# Patient Record
Sex: FEMALE | Race: WHITE | NOT HISPANIC OR LATINO | Employment: UNEMPLOYED | URBAN - METROPOLITAN AREA
[De-identification: names, ages, dates, MRNs, and addresses within clinical notes are randomized per-mention and may not be internally consistent; named-entity substitution may affect disease eponyms.]

---

## 2021-09-06 ENCOUNTER — HOSPITAL ENCOUNTER (EMERGENCY)
Facility: HOSPITAL | Age: 21
Discharge: LEFT BEFORE DISCHARGE | End: 2021-09-06
Admitting: EMERGENCY MEDICINE

## 2021-09-06 ENCOUNTER — APPOINTMENT (OUTPATIENT)
Dept: CT IMAGING | Facility: HOSPITAL | Age: 21
End: 2021-09-06

## 2021-09-06 VITALS
BODY MASS INDEX: 16.63 KG/M2 | HEART RATE: 97 BPM | SYSTOLIC BLOOD PRESSURE: 103 MMHG | DIASTOLIC BLOOD PRESSURE: 68 MMHG | HEIGHT: 62 IN | RESPIRATION RATE: 20 BRPM | TEMPERATURE: 97.7 F | WEIGHT: 90.39 LBS | OXYGEN SATURATION: 98 %
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
## 2021-09-06 DIAGNOSIS — T07.XXXA ABRASIONS OF MULTIPLE SITES: ICD-10-CM

## 2021-09-06 DIAGNOSIS — S22.31XA CLOSED FRACTURE OF ONE RIB OF RIGHT SIDE, INITIAL ENCOUNTER: ICD-10-CM

## 2021-09-06 DIAGNOSIS — S27.321A CONTUSION OF RIGHT LUNG, INITIAL ENCOUNTER: ICD-10-CM

## 2021-09-06 DIAGNOSIS — S42.001A CLOSED NONDISPLACED FRACTURE OF RIGHT CLAVICLE, UNSPECIFIED PART OF CLAVICLE, INITIAL ENCOUNTER: ICD-10-CM

## 2021-09-06 LAB
ALBUMIN SERPL-MCNC: 3.8 G/DL (ref 3.5–5.2)
ALBUMIN/GLOB SERPL: 1.6 G/DL
ALP SERPL-CCNC: 75 U/L (ref 39–117)
ALT SERPL W P-5'-P-CCNC: 19 U/L (ref 1–33)
AMPHET+METHAMPHET UR QL: POSITIVE
ANION GAP SERPL CALCULATED.3IONS-SCNC: 8 MMOL/L (ref 5–15)
AST SERPL-CCNC: 26 U/L (ref 1–32)
B-HCG UR QL: NEGATIVE
BACTERIA UR QL AUTO: ABNORMAL /HPF
BARBITURATES UR QL SCN: NEGATIVE
BASOPHILS # BLD AUTO: 0 10*3/MM3 (ref 0–0.2)
BASOPHILS NFR BLD AUTO: 0.4 % (ref 0–1.5)
BENZODIAZ UR QL SCN: NEGATIVE
BILIRUB SERPL-MCNC: 0.4 MG/DL (ref 0–1.2)
BILIRUB UR QL STRIP: NEGATIVE
BUN SERPL-MCNC: 10 MG/DL (ref 6–20)
BUN/CREAT SERPL: 13 (ref 7–25)
CALCIUM SPEC-SCNC: 8.1 MG/DL (ref 8.6–10.5)
CANNABINOIDS SERPL QL: NEGATIVE
CHLORIDE SERPL-SCNC: 103 MMOL/L (ref 98–107)
CLARITY UR: ABNORMAL
CO2 SERPL-SCNC: 27 MMOL/L (ref 22–29)
COCAINE UR QL: NEGATIVE
COLOR UR: ABNORMAL
CREAT SERPL-MCNC: 0.77 MG/DL (ref 0.57–1)
DEPRECATED RDW RBC AUTO: 39.4 FL (ref 37–54)
EOSINOPHIL # BLD AUTO: 0.1 10*3/MM3 (ref 0–0.4)
EOSINOPHIL NFR BLD AUTO: 0.5 % (ref 0.3–6.2)
ERYTHROCYTE [DISTWIDTH] IN BLOOD BY AUTOMATED COUNT: 13 % (ref 12.3–15.4)
GFR SERPL CREATININE-BSD FRML MDRD: 96 ML/MIN/1.73
GLOBULIN UR ELPH-MCNC: 2.4 GM/DL
GLUCOSE SERPL-MCNC: 89 MG/DL (ref 65–99)
GLUCOSE UR STRIP-MCNC: NEGATIVE MG/DL
GRAN CASTS URNS QL MICRO: ABNORMAL /LPF
HCT VFR BLD AUTO: 33.1 % (ref 34–46.6)
HGB BLD-MCNC: 11.1 G/DL (ref 12–15.9)
HGB UR QL STRIP.AUTO: ABNORMAL
HYALINE CASTS UR QL AUTO: ABNORMAL /LPF
KETONES UR QL STRIP: NEGATIVE
LEUKOCYTE ESTERASE UR QL STRIP.AUTO: ABNORMAL
LIPASE SERPL-CCNC: 11 U/L (ref 13–60)
LYMPHOCYTES # BLD AUTO: 1.6 10*3/MM3 (ref 0.7–3.1)
LYMPHOCYTES NFR BLD AUTO: 15.7 % (ref 19.6–45.3)
MCH RBC QN AUTO: 29.2 PG (ref 26.6–33)
MCHC RBC AUTO-ENTMCNC: 33.6 G/DL (ref 31.5–35.7)
MCV RBC AUTO: 87.1 FL (ref 79–97)
METHADONE UR QL SCN: NEGATIVE
MONOCYTES # BLD AUTO: 1.2 10*3/MM3 (ref 0.1–0.9)
MONOCYTES NFR BLD AUTO: 11.4 % (ref 5–12)
NEUTROPHILS NFR BLD AUTO: 7.4 10*3/MM3 (ref 1.7–7)
NEUTROPHILS NFR BLD AUTO: 72 % (ref 42.7–76)
NITRITE UR QL STRIP: POSITIVE
NRBC BLD AUTO-RTO: 0.1 /100 WBC (ref 0–0.2)
OPIATES UR QL: POSITIVE
OXYCODONE UR QL SCN: NEGATIVE
PH UR STRIP.AUTO: 7 [PH] (ref 5–8)
PLATELET # BLD AUTO: 307 10*3/MM3 (ref 140–450)
PMV BLD AUTO: 7.3 FL (ref 6–12)
POTASSIUM SERPL-SCNC: 3.8 MMOL/L (ref 3.5–5.2)
PROT SERPL-MCNC: 6.2 G/DL (ref 6–8.5)
PROT UR QL STRIP: ABNORMAL
RBC # BLD AUTO: 3.81 10*6/MM3 (ref 3.77–5.28)
RBC # UR: ABNORMAL /HPF
REF LAB TEST METHOD: ABNORMAL
SODIUM SERPL-SCNC: 138 MMOL/L (ref 136–145)
SP GR UR STRIP: 1.02 (ref 1–1.03)
SQUAMOUS #/AREA URNS HPF: ABNORMAL /HPF
UROBILINOGEN UR QL STRIP: ABNORMAL
WBC # BLD AUTO: 10.3 10*3/MM3 (ref 3.4–10.8)
WBC CLUMPS # UR AUTO: ABNORMAL /HPF
WBC UR QL AUTO: ABNORMAL /HPF

## 2021-09-06 PROCEDURE — 71260 CT THORAX DX C+: CPT

## 2021-09-06 PROCEDURE — 85025 COMPLETE CBC W/AUTO DIFF WBC: CPT | Performed by: NURSE PRACTITIONER

## 2021-09-06 PROCEDURE — 81025 URINE PREGNANCY TEST: CPT | Performed by: NURSE PRACTITIONER

## 2021-09-06 PROCEDURE — 0 IOPAMIDOL PER 1 ML: Performed by: NURSE PRACTITIONER

## 2021-09-06 PROCEDURE — 70450 CT HEAD/BRAIN W/O DYE: CPT

## 2021-09-06 PROCEDURE — 96375 TX/PRO/DX INJ NEW DRUG ADDON: CPT

## 2021-09-06 PROCEDURE — 80053 COMPREHEN METABOLIC PANEL: CPT | Performed by: NURSE PRACTITIONER

## 2021-09-06 PROCEDURE — 25010000002 CEFTRIAXONE PER 250 MG: Performed by: NURSE PRACTITIONER

## 2021-09-06 PROCEDURE — 80307 DRUG TEST PRSMV CHEM ANLYZR: CPT | Performed by: NURSE PRACTITIONER

## 2021-09-06 PROCEDURE — 83690 ASSAY OF LIPASE: CPT | Performed by: NURSE PRACTITIONER

## 2021-09-06 PROCEDURE — 25010000002 MORPHINE PER 10 MG: Performed by: NURSE PRACTITIONER

## 2021-09-06 PROCEDURE — 99283 EMERGENCY DEPT VISIT LOW MDM: CPT

## 2021-09-06 PROCEDURE — 74177 CT ABD & PELVIS W/CONTRAST: CPT

## 2021-09-06 PROCEDURE — 96374 THER/PROPH/DIAG INJ IV PUSH: CPT

## 2021-09-06 PROCEDURE — 25010000002 ONDANSETRON PER 1 MG: Performed by: NURSE PRACTITIONER

## 2021-09-06 PROCEDURE — 87186 SC STD MICRODIL/AGAR DIL: CPT | Performed by: NURSE PRACTITIONER

## 2021-09-06 PROCEDURE — 81001 URINALYSIS AUTO W/SCOPE: CPT | Performed by: NURSE PRACTITIONER

## 2021-09-06 PROCEDURE — 87086 URINE CULTURE/COLONY COUNT: CPT | Performed by: NURSE PRACTITIONER

## 2021-09-06 PROCEDURE — 87077 CULTURE AEROBIC IDENTIFY: CPT | Performed by: NURSE PRACTITIONER

## 2021-09-06 RX ORDER — ONDANSETRON 2 MG/ML
4 INJECTION INTRAMUSCULAR; INTRAVENOUS ONCE
Status: COMPLETED | OUTPATIENT
Start: 2021-09-06 | End: 2021-09-06

## 2021-09-06 RX ORDER — MORPHINE SULFATE 4 MG/ML
4 INJECTION, SOLUTION INTRAMUSCULAR; INTRAVENOUS ONCE
Status: COMPLETED | OUTPATIENT
Start: 2021-09-06 | End: 2021-09-06

## 2021-09-06 RX ORDER — SODIUM CHLORIDE 0.9 % (FLUSH) 0.9 %
10 SYRINGE (ML) INJECTION AS NEEDED
Status: DISCONTINUED | OUTPATIENT
Start: 2021-09-06 | End: 2021-09-06 | Stop reason: HOSPADM

## 2021-09-06 RX ADMIN — ONDANSETRON 4 MG: 2 INJECTION INTRAMUSCULAR; INTRAVENOUS at 15:31

## 2021-09-06 RX ADMIN — CEFTRIAXONE 1 G: 10 INJECTION, POWDER, FOR SOLUTION INTRAVENOUS at 16:47

## 2021-09-06 RX ADMIN — SODIUM CHLORIDE 1000 ML: 0.9 INJECTION, SOLUTION INTRAVENOUS at 15:30

## 2021-09-06 RX ADMIN — IOPAMIDOL 100 ML: 755 INJECTION, SOLUTION INTRAVENOUS at 16:36

## 2021-09-06 RX ADMIN — MORPHINE SULFATE 4 MG: 4 INJECTION INTRAVENOUS at 15:31
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
## 2021-09-06 NOTE — ED NOTES
Pt was given iv rocephin states her pain in controlled but her whole body is sore with any kind of movement, vitals are stable awaiting radiology to be read      Ludy Wells RN  09/06/21 9085
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
## 2021-09-08 LAB — BACTERIA SPEC AEROBE CULT: ABNORMAL

## 2021-09-26 ENCOUNTER — APPOINTMENT (OUTPATIENT)
Dept: GENERAL RADIOLOGY | Facility: HOSPITAL | Age: 21
End: 2021-09-26

## 2021-09-26 ENCOUNTER — HOSPITAL ENCOUNTER (EMERGENCY)
Facility: HOSPITAL | Age: 21
Discharge: HOME | End: 2021-09-26
Attending: EMERGENCY MEDICINE | Admitting: EMERGENCY MEDICINE

## 2021-09-26 VITALS
RESPIRATION RATE: 16 BRPM | SYSTOLIC BLOOD PRESSURE: 154 MMHG | HEART RATE: 125 BPM | HEIGHT: 62 IN | DIASTOLIC BLOOD PRESSURE: 82 MMHG | BODY MASS INDEX: 17.3 KG/M2 | TEMPERATURE: 98.9 F | WEIGHT: 94 LBS | OXYGEN SATURATION: 100 %

## 2021-09-26 DIAGNOSIS — S42.021A CLOSED DISPLACED FRACTURE OF SHAFT OF RIGHT CLAVICLE, INITIAL ENCOUNTER: Primary | ICD-10-CM

## 2021-09-26 PROCEDURE — 73000 X-RAY EXAM OF COLLAR BONE: CPT

## 2021-09-26 PROCEDURE — 99282 EMERGENCY DEPT VISIT SF MDM: CPT

## 2021-09-26 RX ORDER — HYDROCODONE BITARTRATE AND ACETAMINOPHEN 10; 325 MG/1; MG/1
1 TABLET ORAL ONCE AS NEEDED
Status: DISCONTINUED | OUTPATIENT
Start: 2021-09-26 | End: 2021-09-26 | Stop reason: HOSPADM

## 2021-09-26 RX ORDER — HYDROCODONE BITARTRATE AND ACETAMINOPHEN 10; 325 MG/1; MG/1
1 TABLET ORAL EVERY 6 HOURS PRN
Qty: 12 TABLET | Refills: 0 | Status: SHIPPED | OUTPATIENT
Start: 2021-09-26 | End: 2021-10-08

## 2021-09-27 NOTE — DISCHARGE INSTRUCTIONS
Call the doctor listed above tomorrow morning for a follow-up appointment.  Wear the sling that you have and apply an ice pack to the swelling tonight.  Hydrocodone for pain.
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
## 2021-10-08 ENCOUNTER — TRANSCRIBE ORDERS (OUTPATIENT)
Dept: ADMINISTRATIVE | Facility: HOSPITAL | Age: 21
End: 2021-10-08

## 2021-10-08 ENCOUNTER — HOSPITAL ENCOUNTER (OUTPATIENT)
Facility: HOSPITAL | Age: 21
Setting detail: HOSPITAL OUTPATIENT SURGERY
End: 2021-10-08
Attending: ORTHOPAEDIC SURGERY | Admitting: ORTHOPAEDIC SURGERY

## 2021-10-08 ENCOUNTER — LAB (OUTPATIENT)
Dept: LAB | Facility: HOSPITAL | Age: 21
End: 2021-10-08

## 2021-10-08 DIAGNOSIS — Z11.52 ENCOUNTER FOR SCREENING FOR SEVERE ACUTE RESPIRATORY SYNDROME CORONAVIRUS 2 (SARS-COV-2) INFECTION: ICD-10-CM

## 2021-10-08 DIAGNOSIS — Z11.52 ENCOUNTER FOR SCREENING FOR SEVERE ACUTE RESPIRATORY SYNDROME CORONAVIRUS 2 (SARS-COV-2) INFECTION: Primary | ICD-10-CM

## 2021-10-08 LAB — SARS-COV-2 RNA PNL SPEC NAA+PROBE: DETECTED

## 2021-10-08 PROCEDURE — U0003 INFECTIOUS AGENT DETECTION BY NUCLEIC ACID (DNA OR RNA); SEVERE ACUTE RESPIRATORY SYNDROME CORONAVIRUS 2 (SARS-COV-2) (CORONAVIRUS DISEASE [COVID-19]), AMPLIFIED PROBE TECHNIQUE, MAKING USE OF HIGH THROUGHPUT TECHNOLOGIES AS DESCRIBED BY CMS-2020-01-R: HCPCS

## 2021-10-08 PROCEDURE — C9803 HOPD COVID-19 SPEC COLLECT: HCPCS

## 2021-10-08 NOTE — PAT
Called pt several times to go over history for scheduled surgery on 10/9/2021.  Pt never called back.

## 2021-10-20 ENCOUNTER — LAB (OUTPATIENT)
Dept: LAB | Facility: HOSPITAL | Age: 21
End: 2021-10-20

## 2021-10-20 LAB
DEPRECATED RDW RBC AUTO: 39.4 FL (ref 37–54)
ERYTHROCYTE [DISTWIDTH] IN BLOOD BY AUTOMATED COUNT: 12.7 % (ref 12.3–15.4)
HCT VFR BLD AUTO: 35.2 % (ref 34–46.6)
HGB BLD-MCNC: 11.8 G/DL (ref 12–15.9)
MCH RBC QN AUTO: 29.2 PG (ref 26.6–33)
MCHC RBC AUTO-ENTMCNC: 33.5 G/DL (ref 31.5–35.7)
MCV RBC AUTO: 87.1 FL (ref 79–97)
PLATELET # BLD AUTO: 343 10*3/MM3 (ref 140–450)
PMV BLD AUTO: 9.4 FL (ref 6–12)
RBC # BLD AUTO: 4.04 10*6/MM3 (ref 3.77–5.28)
WBC # BLD AUTO: 8.82 10*3/MM3 (ref 3.4–10.8)

## 2021-10-20 PROCEDURE — 85027 COMPLETE CBC AUTOMATED: CPT

## 2021-10-25 ENCOUNTER — ANESTHESIA EVENT (OUTPATIENT)
Dept: PERIOP | Facility: HOSPITAL | Age: 21
End: 2021-10-25

## 2021-10-26 ENCOUNTER — ANESTHESIA (OUTPATIENT)
Dept: PERIOP | Facility: HOSPITAL | Age: 21
End: 2021-10-26

## 2021-10-26 ENCOUNTER — HOSPITAL ENCOUNTER (OUTPATIENT)
Facility: HOSPITAL | Age: 21
Setting detail: HOSPITAL OUTPATIENT SURGERY
Discharge: HOME | End: 2021-10-26
Attending: ORTHOPAEDIC SURGERY | Admitting: ORTHOPAEDIC SURGERY

## 2021-10-26 VITALS
BODY MASS INDEX: 17.48 KG/M2 | HEIGHT: 62 IN | RESPIRATION RATE: 14 BRPM | WEIGHT: 95 LBS | SYSTOLIC BLOOD PRESSURE: 109 MMHG | HEART RATE: 86 BPM | OXYGEN SATURATION: 100 % | DIASTOLIC BLOOD PRESSURE: 54 MMHG | TEMPERATURE: 98.7 F

## 2021-10-26 LAB — B-HCG UR QL: POSITIVE

## 2021-10-26 PROCEDURE — G0463 HOSPITAL OUTPT CLINIC VISIT: HCPCS | Performed by: ORTHOPAEDIC SURGERY

## 2021-10-26 PROCEDURE — 81025 URINE PREGNANCY TEST: CPT | Performed by: ORTHOPAEDIC SURGERY

## 2021-10-26 RX ORDER — SODIUM CHLORIDE 0.9 % (FLUSH) 0.9 %
10 SYRINGE (ML) INJECTION EVERY 12 HOURS SCHEDULED
Status: DISCONTINUED | OUTPATIENT
Start: 2021-10-26 | End: 2021-10-26 | Stop reason: HOSPADM

## 2021-10-26 RX ORDER — SODIUM CHLORIDE 0.9 % (FLUSH) 0.9 %
10 SYRINGE (ML) INJECTION AS NEEDED
Status: DISCONTINUED | OUTPATIENT
Start: 2021-10-26 | End: 2021-10-26 | Stop reason: HOSPADM

## 2021-10-26 RX ORDER — SODIUM CHLORIDE, SODIUM LACTATE, POTASSIUM CHLORIDE, CALCIUM CHLORIDE 600; 310; 30; 20 MG/100ML; MG/100ML; MG/100ML; MG/100ML
9 INJECTION, SOLUTION INTRAVENOUS CONTINUOUS PRN
Status: DISCONTINUED | OUTPATIENT
Start: 2021-10-26 | End: 2021-10-26 | Stop reason: HOSPADM

## 2022-05-11 ENCOUNTER — HOSPITAL ENCOUNTER (INPATIENT)
Facility: HOSPITAL | Age: 22
LOS: 3 days | Discharge: HOME | End: 2022-05-14
Attending: OBSTETRICS & GYNECOLOGY

## 2022-05-11 DIAGNOSIS — O16.3 HYPERTENSION AFFECTING PREGNANCY IN THIRD TRIMESTER: ICD-10-CM

## 2022-05-11 DIAGNOSIS — I16.1 HYPERTENSIVE EMERGENCY: Primary | ICD-10-CM

## 2022-05-11 PROBLEM — Z34.90 PREGNANT AND NOT YET DELIVERED: Status: ACTIVE | Noted: 2022-05-11

## 2022-05-11 LAB
A1 MICROGLOB PLACENTAL VAG QL: POSITIVE
ABO GROUP BLD: NORMAL
ALBUMIN SERPL-MCNC: 2 G/DL (ref 3.5–5.2)
ALBUMIN/GLOB SERPL: 0.6 G/DL
ALP SERPL-CCNC: 292 U/L (ref 39–117)
ALT SERPL W P-5'-P-CCNC: 12 U/L (ref 1–33)
AMORPH URATE CRY URNS QL MICRO: ABNORMAL /HPF
AMPHET+METHAMPHET UR QL: POSITIVE
ANION GAP SERPL CALCULATED.3IONS-SCNC: 11 MMOL/L (ref 5–15)
AST SERPL-CCNC: 29 U/L (ref 1–32)
BACTERIA UR QL AUTO: ABNORMAL /HPF
BARBITURATES UR QL SCN: NEGATIVE
BASOPHILS # BLD AUTO: 0.1 10*3/MM3 (ref 0–0.2)
BASOPHILS NFR BLD AUTO: 0.7 % (ref 0–1.5)
BENZODIAZ UR QL SCN: NEGATIVE
BILIRUB SERPL-MCNC: 0.2 MG/DL (ref 0–1.2)
BILIRUB UR QL STRIP: NEGATIVE
BLD GP AB SCN SERPL QL: NEGATIVE
BUN SERPL-MCNC: 22 MG/DL (ref 6–20)
BUN/CREAT SERPL: 17.2 (ref 7–25)
CALCIUM SPEC-SCNC: 7.8 MG/DL (ref 8.6–10.5)
CANNABINOIDS SERPL QL: NEGATIVE
CHLORIDE SERPL-SCNC: 106 MMOL/L (ref 98–107)
CLARITY UR: ABNORMAL
CO2 SERPL-SCNC: 18 MMOL/L (ref 22–29)
COCAINE UR QL: NEGATIVE
COLOR UR: ABNORMAL
CREAT SERPL-MCNC: 1.28 MG/DL (ref 0.57–1)
DEPRECATED RDW RBC AUTO: 43.8 FL (ref 37–54)
EGFRCR SERPLBLD CKD-EPI 2021: 61.3 ML/MIN/1.73
EOSINOPHIL # BLD AUTO: 0.1 10*3/MM3 (ref 0–0.4)
EOSINOPHIL NFR BLD AUTO: 0.8 % (ref 0.3–6.2)
ERYTHROCYTE [DISTWIDTH] IN BLOOD BY AUTOMATED COUNT: 14.4 % (ref 12.3–15.4)
GLOBULIN UR ELPH-MCNC: 3.6 GM/DL
GLUCOSE SERPL-MCNC: 78 MG/DL (ref 65–99)
GLUCOSE UR STRIP-MCNC: NEGATIVE MG/DL
GRAN CASTS URNS QL MICRO: ABNORMAL /LPF
HBV SURFACE AG SERPL QL IA: NORMAL
HCT VFR BLD AUTO: 48.7 % (ref 34–46.6)
HCV AB SER DONR QL: NORMAL
HGB BLD-MCNC: 15.4 G/DL (ref 12–15.9)
HGB UR QL STRIP.AUTO: ABNORMAL
HIV1+2 AB SER QL: NORMAL
HYALINE CASTS UR QL AUTO: ABNORMAL /LPF
KETONES UR QL STRIP: ABNORMAL
LEUKOCYTE ESTERASE UR QL STRIP.AUTO: ABNORMAL
LYMPHOCYTES # BLD AUTO: 3.2 10*3/MM3 (ref 0.7–3.1)
LYMPHOCYTES NFR BLD AUTO: 23.6 % (ref 19.6–45.3)
MCH RBC QN AUTO: 28.1 PG (ref 26.6–33)
MCHC RBC AUTO-ENTMCNC: 31.6 G/DL (ref 31.5–35.7)
MCV RBC AUTO: 88.8 FL (ref 79–97)
METHADONE UR QL SCN: NEGATIVE
MONOCYTES # BLD AUTO: 1 10*3/MM3 (ref 0.1–0.9)
MONOCYTES NFR BLD AUTO: 7.3 % (ref 5–12)
NEUTROPHILS NFR BLD AUTO: 67.6 % (ref 42.7–76)
NEUTROPHILS NFR BLD AUTO: 9.2 10*3/MM3 (ref 1.7–7)
NITRITE UR QL STRIP: NEGATIVE
NRBC BLD AUTO-RTO: 0.2 /100 WBC (ref 0–0.2)
OPIATES UR QL: NEGATIVE
OXYCODONE UR QL SCN: NEGATIVE
PH UR STRIP.AUTO: <=5 [PH] (ref 5–8)
PLATELET # BLD AUTO: 181 10*3/MM3 (ref 140–450)
PMV BLD AUTO: 10.1 FL (ref 6–12)
POTASSIUM SERPL-SCNC: 4.6 MMOL/L (ref 3.5–5.2)
PROT SERPL-MCNC: 5.6 G/DL (ref 6–8.5)
PROT UR QL STRIP: ABNORMAL
RBC # BLD AUTO: 5.49 10*6/MM3 (ref 3.77–5.28)
RBC # UR STRIP: ABNORMAL /HPF
REF LAB TEST METHOD: ABNORMAL
RH BLD: POSITIVE
SARS-COV-2 RNA RESP QL NAA+PROBE: NOT DETECTED
SODIUM SERPL-SCNC: 135 MMOL/L (ref 136–145)
SP GR UR STRIP: 1.02 (ref 1–1.03)
SQUAMOUS #/AREA URNS HPF: ABNORMAL /HPF
T&S EXPIRATION DATE: NORMAL
UROBILINOGEN UR QL STRIP: ABNORMAL
WAXY CASTS #/AREA URNS LPF: ABNORMAL /LPF
WBC # UR STRIP: ABNORMAL /HPF
WBC NRBC COR # BLD: 13.6 10*3/MM3 (ref 3.4–10.8)

## 2022-05-11 PROCEDURE — 80053 COMPREHEN METABOLIC PANEL: CPT | Performed by: OBSTETRICS & GYNECOLOGY

## 2022-05-11 PROCEDURE — 80307 DRUG TEST PRSMV CHEM ANLYZR: CPT | Performed by: OBSTETRICS & GYNECOLOGY

## 2022-05-11 PROCEDURE — 87340 HEPATITIS B SURFACE AG IA: CPT | Performed by: OBSTETRICS & GYNECOLOGY

## 2022-05-11 PROCEDURE — 86900 BLOOD TYPING SEROLOGIC ABO: CPT

## 2022-05-11 PROCEDURE — 84112 EVAL AMNIOTIC FLUID PROTEIN: CPT | Performed by: OBSTETRICS & GYNECOLOGY

## 2022-05-11 PROCEDURE — 86900 BLOOD TYPING SEROLOGIC ABO: CPT | Performed by: OBSTETRICS & GYNECOLOGY

## 2022-05-11 PROCEDURE — 86850 RBC ANTIBODY SCREEN: CPT | Performed by: OBSTETRICS & GYNECOLOGY

## 2022-05-11 PROCEDURE — 86901 BLOOD TYPING SEROLOGIC RH(D): CPT

## 2022-05-11 PROCEDURE — 25010000002 PENICILLIN G POTASSIUM PER 600000 UNITS: Performed by: OBSTETRICS & GYNECOLOGY

## 2022-05-11 PROCEDURE — 0 MORPHINE SULFATE 4 MG/ML SOLUTION: Performed by: OBSTETRICS & GYNECOLOGY

## 2022-05-11 PROCEDURE — 87086 URINE CULTURE/COLONY COUNT: CPT | Performed by: OBSTETRICS & GYNECOLOGY

## 2022-05-11 PROCEDURE — 0 MAGNESIUM SULFATE 20 GM/500ML SOLUTION: Performed by: OBSTETRICS & GYNECOLOGY

## 2022-05-11 PROCEDURE — G0432 EIA HIV-1/HIV-2 SCREEN: HCPCS | Performed by: OBSTETRICS & GYNECOLOGY

## 2022-05-11 PROCEDURE — 80081 OBSTETRIC PANEL INC HIV TSTG: CPT | Performed by: OBSTETRICS & GYNECOLOGY

## 2022-05-11 PROCEDURE — 81001 URINALYSIS AUTO W/SCOPE: CPT | Performed by: OBSTETRICS & GYNECOLOGY

## 2022-05-11 PROCEDURE — 86901 BLOOD TYPING SEROLOGIC RH(D): CPT | Performed by: OBSTETRICS & GYNECOLOGY

## 2022-05-11 PROCEDURE — 85025 COMPLETE CBC W/AUTO DIFF WBC: CPT | Performed by: OBSTETRICS & GYNECOLOGY

## 2022-05-11 PROCEDURE — 86803 HEPATITIS C AB TEST: CPT | Performed by: OBSTETRICS & GYNECOLOGY

## 2022-05-11 PROCEDURE — U0003 INFECTIOUS AGENT DETECTION BY NUCLEIC ACID (DNA OR RNA); SEVERE ACUTE RESPIRATORY SYNDROME CORONAVIRUS 2 (SARS-COV-2) (CORONAVIRUS DISEASE [COVID-19]), AMPLIFIED PROBE TECHNIQUE, MAKING USE OF HIGH THROUGHPUT TECHNOLOGIES AS DESCRIBED BY CMS-2020-01-R: HCPCS | Performed by: OBSTETRICS & GYNECOLOGY

## 2022-05-11 RX ORDER — MAGNESIUM CARB/ALUMINUM HYDROX 105-160MG
30 TABLET,CHEWABLE ORAL ONCE
Status: DISCONTINUED | OUTPATIENT
Start: 2022-05-11 | End: 2022-05-12

## 2022-05-11 RX ORDER — MAGNESIUM SULFATE HEPTAHYDRATE 40 MG/ML
0.5 INJECTION, SOLUTION INTRAVENOUS CONTINUOUS
Status: DISCONTINUED | OUTPATIENT
Start: 2022-05-11 | End: 2022-05-14 | Stop reason: HOSPADM

## 2022-05-11 RX ORDER — ONDANSETRON 4 MG/1
4 TABLET, FILM COATED ORAL EVERY 8 HOURS PRN
Status: DISCONTINUED | OUTPATIENT
Start: 2022-05-11 | End: 2022-05-14 | Stop reason: HOSPADM

## 2022-05-11 RX ORDER — NIFEDIPINE 10 MG/1
10 CAPSULE ORAL ONCE
Status: COMPLETED | OUTPATIENT
Start: 2022-05-12 | End: 2022-05-11

## 2022-05-11 RX ORDER — METHYLERGONOVINE MALEATE 0.2 MG/ML
200 INJECTION INTRAVENOUS ONCE AS NEEDED
Status: DISCONTINUED | OUTPATIENT
Start: 2022-05-11 | End: 2022-05-13 | Stop reason: HOSPADM

## 2022-05-11 RX ORDER — IBUPROFEN 600 MG/1
600 TABLET ORAL EVERY 4 HOURS PRN
Status: DISCONTINUED | OUTPATIENT
Start: 2022-05-11 | End: 2022-05-11

## 2022-05-11 RX ORDER — IBUPROFEN 600 MG/1
600 TABLET ORAL EVERY 6 HOURS PRN
Status: DISCONTINUED | OUTPATIENT
Start: 2022-05-11 | End: 2022-05-14 | Stop reason: HOSPADM

## 2022-05-11 RX ORDER — OXYTOCIN/0.9 % SODIUM CHLORIDE 30/500 ML
999 PLASTIC BAG, INJECTION (ML) INTRAVENOUS ONCE
Status: COMPLETED | OUTPATIENT
Start: 2022-05-11 | End: 2022-05-11

## 2022-05-11 RX ORDER — NIFEDIPINE 10 MG/1
10 CAPSULE ORAL ONCE
Status: COMPLETED | OUTPATIENT
Start: 2022-05-11 | End: 2022-05-11

## 2022-05-11 RX ORDER — SODIUM CHLORIDE 0.9 % (FLUSH) 0.9 %
10 SYRINGE (ML) INJECTION EVERY 12 HOURS SCHEDULED
Status: DISCONTINUED | OUTPATIENT
Start: 2022-05-11 | End: 2022-05-13 | Stop reason: HOSPADM

## 2022-05-11 RX ORDER — SODIUM CHLORIDE, SODIUM LACTATE, POTASSIUM CHLORIDE, CALCIUM CHLORIDE 600; 310; 30; 20 MG/100ML; MG/100ML; MG/100ML; MG/100ML
125 INJECTION, SOLUTION INTRAVENOUS CONTINUOUS
Status: DISCONTINUED | OUTPATIENT
Start: 2022-05-11 | End: 2022-05-14 | Stop reason: HOSPADM

## 2022-05-11 RX ORDER — MAGNESIUM SULFATE HEPTAHYDRATE 40 MG/ML
INJECTION, SOLUTION INTRAVENOUS
Status: COMPLETED
Start: 2022-05-11 | End: 2022-05-11

## 2022-05-11 RX ORDER — PRENATAL VIT/IRON FUM/FOLIC AC 27MG-0.8MG
1 TABLET ORAL DAILY
Status: DISCONTINUED | OUTPATIENT
Start: 2022-05-12 | End: 2022-05-14 | Stop reason: HOSPADM

## 2022-05-11 RX ORDER — CALCIUM GLUCONATE 94 MG/ML
1 INJECTION, SOLUTION INTRAVENOUS ONCE AS NEEDED
Status: DISCONTINUED | OUTPATIENT
Start: 2022-05-11 | End: 2022-05-13 | Stop reason: HOSPADM

## 2022-05-11 RX ORDER — DOCUSATE SODIUM 100 MG/1
100 CAPSULE, LIQUID FILLED ORAL 2 TIMES DAILY
Status: DISCONTINUED | OUTPATIENT
Start: 2022-05-12 | End: 2022-05-14 | Stop reason: HOSPADM

## 2022-05-11 RX ORDER — MISOPROSTOL 200 UG/1
800 TABLET ORAL ONCE AS NEEDED
Status: DISCONTINUED | OUTPATIENT
Start: 2022-05-11 | End: 2022-05-13 | Stop reason: HOSPADM

## 2022-05-11 RX ORDER — BISACODYL 10 MG
10 SUPPOSITORY, RECTAL RECTAL DAILY PRN
Status: DISCONTINUED | OUTPATIENT
Start: 2022-05-12 | End: 2022-05-14 | Stop reason: HOSPADM

## 2022-05-11 RX ORDER — HYDROCORTISONE ACETATE PRAMOXINE HCL 2.5; 1 G/100G; G/100G
1 CREAM TOPICAL AS NEEDED
Status: DISCONTINUED | OUTPATIENT
Start: 2022-05-11 | End: 2022-05-14 | Stop reason: HOSPADM

## 2022-05-11 RX ORDER — PRENATAL VIT NO.126/IRON/FOLIC 28MG-0.8MG
1 TABLET ORAL DAILY
COMMUNITY
End: 2022-06-07

## 2022-05-11 RX ORDER — OXYTOCIN/0.9 % SODIUM CHLORIDE 30/500 ML
125 PLASTIC BAG, INJECTION (ML) INTRAVENOUS CONTINUOUS PRN
Status: COMPLETED | OUTPATIENT
Start: 2022-05-11 | End: 2022-05-11

## 2022-05-11 RX ORDER — OXYTOCIN/0.9 % SODIUM CHLORIDE 30/500 ML
250 PLASTIC BAG, INJECTION (ML) INTRAVENOUS CONTINUOUS
Status: DISPENSED | OUTPATIENT
Start: 2022-05-11 | End: 2022-05-11

## 2022-05-11 RX ORDER — HYDROCODONE BITARTRATE AND ACETAMINOPHEN 5; 325 MG/1; MG/1
1 TABLET ORAL EVERY 4 HOURS PRN
Status: DISCONTINUED | OUTPATIENT
Start: 2022-05-11 | End: 2022-05-14 | Stop reason: HOSPADM

## 2022-05-11 RX ORDER — LIDOCAINE HYDROCHLORIDE 10 MG/ML
5 INJECTION, SOLUTION EPIDURAL; INFILTRATION; INTRACAUDAL; PERINEURAL AS NEEDED
Status: DISCONTINUED | OUTPATIENT
Start: 2022-05-11 | End: 2022-05-13 | Stop reason: HOSPADM

## 2022-05-11 RX ORDER — MORPHINE SULFATE 4 MG/ML
4 INJECTION, SOLUTION INTRAMUSCULAR; INTRAVENOUS
Status: DISCONTINUED | OUTPATIENT
Start: 2022-05-11 | End: 2022-05-14 | Stop reason: HOSPADM

## 2022-05-11 RX ORDER — CARBOPROST TROMETHAMINE 250 UG/ML
250 INJECTION, SOLUTION INTRAMUSCULAR
Status: DISCONTINUED | OUTPATIENT
Start: 2022-05-11 | End: 2022-05-13 | Stop reason: HOSPADM

## 2022-05-11 RX ORDER — LIDOCAINE HYDROCHLORIDE 10 MG/ML
30 INJECTION, SOLUTION EPIDURAL; INFILTRATION; INTRACAUDAL; PERINEURAL ONCE
Status: DISCONTINUED | OUTPATIENT
Start: 2022-05-11 | End: 2022-05-14 | Stop reason: HOSPADM

## 2022-05-11 RX ORDER — SODIUM CHLORIDE 0.9 % (FLUSH) 0.9 %
10 SYRINGE (ML) INJECTION AS NEEDED
Status: DISCONTINUED | OUTPATIENT
Start: 2022-05-11 | End: 2022-05-13 | Stop reason: HOSPADM

## 2022-05-11 RX ADMIN — SODIUM CHLORIDE, SODIUM LACTATE, POTASSIUM CHLORIDE, AND CALCIUM CHLORIDE 125 ML/HR: 600; 310; 30; 20 INJECTION, SOLUTION INTRAVENOUS at 17:40

## 2022-05-11 RX ADMIN — Medication 999 ML/HR: at 22:12

## 2022-05-11 RX ADMIN — MORPHINE SULFATE 4 MG: 4 INJECTION INTRAVENOUS at 20:18

## 2022-05-11 RX ADMIN — SODIUM CHLORIDE 5 MILLION UNITS: 900 INJECTION INTRAVENOUS at 17:42

## 2022-05-11 RX ADMIN — MAGNESIUM SULFATE HEPTAHYDRATE 4 G: 40 INJECTION, SOLUTION INTRAVENOUS at 21:02

## 2022-05-11 RX ADMIN — NIFEDIPINE 10 MG: 10 CAPSULE ORAL at 17:22

## 2022-05-11 RX ADMIN — MAGNESIUM SULFATE IN WATER 2 G/HR: 40 INJECTION, SOLUTION INTRAVENOUS at 21:26

## 2022-05-11 RX ADMIN — Medication 50 ML/HR: at 23:51

## 2022-05-11 RX ADMIN — NIFEDIPINE 10 MG: 10 CAPSULE ORAL at 23:49

## 2022-05-11 NOTE — SIGNIFICANT NOTE
Notified MD that  presents at 37w5d, per pt report, /c c/o ctxs. Pt reports that she had one prenatal visit /c Dr. Green. Pt denies any known complications during pregnancy and/or any significant medical history. SVE 4cm/100%/+1. Category II tracing; variable decels present but moderate variability between contractions. Reviewed elevated BP readings /c MD. Neg clonus. +2 edema. No visual disturbances/RUQ pain but pt reports she had a HA this AM. Orders received to admit pt. Routine labs for no prenatal care. Add CMP and UA. PCN protocol. Give 10mg procardia PO now.

## 2022-05-11 NOTE — H&P
TOMI Crook  Obstetric History and Physical     Chief Complaint: Labor    Subjective     Patient is a 22 y.o. female  currently at 37w5d by self-reported EDC, who presents with headache and onset of labor.    The patient had no prenatal care.  Previous admission to labor and delivery was positive for amphetamines and opioids on a urine drug screen.    She presents in a hypertensive crisis with systolic and diastolics in the severe range, 3+ proteinuria, and a positive drug screen for amphetamines.    Additional admission labs reveal a degree of renal dysfunction with elevated creatinine and BUN      Prenatal Information:  Prenatal Results     POC Urine Glucose/Protein     Test Value Reference Range Date Time    Urine Glucose        Urine Protein              Initial Prenatal Labs     Test Value Reference Range Date Time    Hemoglobin  11.8 g/dL 12.0 - 15.9 10/20/21 1340       11.1 g/dL 12.0 - 15.9 21 1656    Hematocrit  35.2 % 34.0 - 46.6 10/20/21 1340       33.1 % 34.0 - 46.6 21 1656    Platelets  343 10*3/mm3 140 - 450 10/20/21 1340       307 10*3/mm3 140 - 450 21 1656    Rubella IgG        Hepatitis B SAg        Hepatitis C Ab        RPR        ABO        Rh        Antibody Screen        HIV        Urine Culture  >100,000 CFU/mL Escherichia coli   21 1547    Gonorrhea        Chlamydia        TSH        HgB A1c               2nd and 3rd Trimester     Test Value Reference Range Date Time    Hemoglobin (repeated)        Hematocrit (repeated)        Platelets   343 10*3/mm3 140 - 450 10/20/21 1340       307 10*3/mm3 140 - 450 21 1656    GCT        Antibody Screen (repeated)        GTT Fasting        GTT 1 Hr        GTT 2 Hr        GTT 3 Hr        Group B Strep              Drug Screening     Test Value Reference Range Date Time    Amphetamine Screen        Barbiturate Screen  Negative  Negative 21 1547    Benzodiazepine Screen  Negative  Negative 21 1547    Methadone Screen   Negative  Negative 09/06/21 1547    Phencyclidine Screen        Opiates Screen  Positive  Negative 09/06/21 1547    THC Screen  Negative  Negative 09/06/21 1547    Cocaine Screen  Negative  Negative 09/06/21 1547    Propoxyphene Screen        Buprenorphine Screen        Methamphetamine Screen        Oxycodone Screen  Negative  Negative 09/06/21 1547    Tricyclic Antidepressants Screen              Other (Risk screening)     Test Value Reference Range Date Time    Varicella IgG        Parvovirus IgG        CMV IgG        Cystic Fibrosis        Hemoglobin electrophoresis        NIPT        MSAFP-4        AFP (for NTD only)              Legend    ^: Historical                      External Prenatal Results     Pregnancy Outside Results - Transcribed From Office Records - See Scanned Records For Details     Test Value Date Time    ABO       Rh       Antibody Screen       Varicella IgG       Rubella       Hgb  11.8 g/dL 10/20/21 1340       11.1 g/dL 09/06/21 1656    Hct  35.2 % 10/20/21 1340       33.1 % 09/06/21 1656    Glucose Fasting GTT       Glucose Tolerance Test 1 hour       Glucose Tolerance Test 3 hour       Gonorrhea (discrete)       Chlamydia (discrete)       RPR       VDRL       Syphilis Antibody       HBsAg       Herpes Simplex Virus PCR       Herpes Simplex VIrus Culture       HIV       Hep C RNA Quant PCR       Hep C Antibody       AFP       Group B Strep       GBS Susceptibility to Clindamycin       GBS Susceptibility to Erythromycin       Fetal Fibronectin       Genetic Testing, Maternal Blood             Drug Screening     Test Value Date Time    Urine Drug Screen       Amphetamine Screen       Barbiturate Screen  Negative  09/06/21 1547    Benzodiazepine Screen  Negative  09/06/21 1547    Methadone Screen  Negative  09/06/21 1547    Phencyclidine Screen       Opiates Screen  Positive  09/06/21 1547    THC Screen  Negative  09/06/21 1547    Cocaine Screen       Propoxyphene Screen       Buprenorphine  Screen       Methamphetamine Screen       Oxycodone Screen  Negative  09/06/21 1547    Tricyclic Antidepressants Screen             Legend    ^: Historical                         Past OB History:         Past Medical History: Past Medical History:   Diagnosis Date   • Clavicle fracture          Past Surgical History History reviewed. No pertinent surgical history.      Family History: No family history on file.   Social History:  reports that she quit smoking about 6 months ago. She has never used smokeless tobacco.   reports previous alcohol use.   reports previous drug use.        General ROS: Pertinent items are noted in HPI    Objective      Vitals:   There were no vitals filed for this visit.    Fetal Heart Rate Assessment:   Category 1    [place]:   External     Physical Exam:     General Appearance:    Alert, cooperative, in no acute distress   Lungs:     Clear to auscultation,respirations regular.    Heart:    Regular rhythm and normal rate.   Breast Exam:    Deferred   Abdomen:     Normal bowel sounds, no masses, soft non-tender,          non-distended, no guarding, no rebound tenderness   Pelvic Exam:         Presentation: Vertex    Cervix: 4 to 5 cm dilated on admission   Extremities:   Moves all extremities well, no edema, no cyanosis, no              redness   Skin:   No bleeding, bruising or rash   Neurologic:   No focal neurologic defect          Laboratory Results:   Lab Results (last 48 hours)     Procedure Component Value Units Date/Time    CBC & Differential [[id number]] Collected: 05/11/22 1739    Specimen: Blood Updated: 05/11/22 1750    Narrative:      The following orders were created for panel order CBC & Differential.  Procedure                               Abnormality         Status                     ---------                               -----------         ------                     CBC Auto Differential[[id number]]                            In process                   Please view  results for these tests on the individual orders.    CBC Auto Differential [[id number]] Collected: 05/11/22 1739    Specimen: Blood Updated: 05/11/22 1750    RPR [[id number]] Collected: 05/11/22 1739    Specimen: Blood Updated: 05/11/22 1750    Comprehensive Metabolic Panel [[id number]] Collected: 05/11/22 1739    Specimen: Blood Updated: 05/11/22 1750    Rubella Antibody, IgG [[id number]] Collected: 05/11/22 1739    Specimen: Blood Updated: 05/11/22 1750    Hepatitis C Antibody [[id number]] Collected: 05/11/22 1739    Specimen: Blood Updated: 05/11/22 1750    HIV-1 & HIV-2 Antibodies [[id number]] Collected: 05/11/22 1739    Specimen: Blood Updated: 05/11/22 1750    Narrative:      The following orders were created for panel order HIV-1 & HIV-2 Antibodies.  Procedure                               Abnormality         Status                     ---------                               -----------         ------                     HIV-1 / O / 2 Ag / Antib...[[id number]]                      In process                   Please view results for these tests on the individual orders.    HIV-1 / O / 2 Ag / Antibody 4th Generation [[id number]] Collected: 05/11/22 1739    Specimen: Blood Updated: 05/11/22 1750    Hepatitis B Surface Antigen [[id number]] Collected: 05/11/22 1739    Specimen: Blood Updated: 05/11/22 1750    COVID PRE-OP / PRE-PROCEDURE SCREENING ORDER (NO ISOLATION) - Swab, Nasopharynx [[id number]] Collected: 05/11/22 1730    Specimen: Swab from Nasopharynx Updated: 05/11/22 1750    Narrative:      The following orders were created for panel order COVID PRE-OP / PRE-PROCEDURE SCREENING ORDER (NO ISOLATION) - Swab, Nasopharynx.  Procedure                               Abnormality         Status                     ---------                               -----------         ------                     COVID-19,CEPHEID/GREG,CO...[[id number]]                      In process                   Please view results for these tests  on the individual orders.    COVID-19,CEPHEID/GREG,COR/DALE/PAD/FESTUS IN-HOUSE(OR EMERGENT/ADD-ON),NP SWAB IN TRANSPORT MEDIA 3-4 HR TAT, RT-PCR - Swab, Nasopharynx [[id number]] Collected: 05/11/22 1730    Specimen: Swab from Nasopharynx Updated: 05/11/22 1750          Other Studies:       Assessment & Plan     Active Problems:    Pregnant and not yet delivered         Assessment:  1.  Intrauterine pregnancy at 37w5d gestation.  2.  Severe preeclampsia.  3.  Lack of prenatal care.  4.  Unknown group B strep status.  Renal dysfunction with elevated creatinine and BUN.  Lisette substance abuse    Plan:  Admission for labor and delivery.  Magnesium sulfate for seizure prophylaxis.  Screening prenatal labs       Cristhian Mclaughlin MD   5/11/2022   17:52 EDT

## 2022-05-12 ENCOUNTER — APPOINTMENT (OUTPATIENT)
Dept: ULTRASOUND IMAGING | Facility: HOSPITAL | Age: 22
End: 2022-05-12

## 2022-05-12 LAB
ALBUMIN SERPL-MCNC: 1.9 G/DL (ref 3.5–5.2)
ALBUMIN SERPL-MCNC: 1.9 G/DL (ref 3.5–5.2)
ALBUMIN/GLOB SERPL: 0.6 G/DL
ALBUMIN/GLOB SERPL: 0.8 G/DL
ALP SERPL-CCNC: 224 U/L (ref 39–117)
ALP SERPL-CCNC: 254 U/L (ref 39–117)
ALT SERPL W P-5'-P-CCNC: 16 U/L (ref 1–33)
ALT SERPL W P-5'-P-CCNC: 16 U/L (ref 1–33)
ANION GAP SERPL CALCULATED.3IONS-SCNC: 10 MMOL/L (ref 5–15)
ANION GAP SERPL CALCULATED.3IONS-SCNC: 11 MMOL/L (ref 5–15)
AST SERPL-CCNC: 33 U/L (ref 1–32)
AST SERPL-CCNC: 37 U/L (ref 1–32)
BACTERIA SPEC AEROBE CULT: NO GROWTH
BASOPHILS # BLD AUTO: 0.1 10*3/MM3 (ref 0–0.2)
BASOPHILS NFR BLD AUTO: 0.6 % (ref 0–1.5)
BILIRUB SERPL-MCNC: 0.2 MG/DL (ref 0–1.2)
BILIRUB SERPL-MCNC: <0.2 MG/DL (ref 0–1.2)
BUN SERPL-MCNC: 24 MG/DL (ref 6–20)
BUN SERPL-MCNC: 25 MG/DL (ref 6–20)
BUN/CREAT SERPL: 18.3 (ref 7–25)
BUN/CREAT SERPL: 21.7 (ref 7–25)
CALCIUM SPEC-SCNC: 6.2 MG/DL (ref 8.6–10.5)
CALCIUM SPEC-SCNC: 7.2 MG/DL (ref 8.6–10.5)
CHLORIDE SERPL-SCNC: 101 MMOL/L (ref 98–107)
CHLORIDE SERPL-SCNC: 102 MMOL/L (ref 98–107)
CO2 SERPL-SCNC: 16 MMOL/L (ref 22–29)
CO2 SERPL-SCNC: 16 MMOL/L (ref 22–29)
CREAT SERPL-MCNC: 1.15 MG/DL (ref 0.57–1)
CREAT SERPL-MCNC: 1.31 MG/DL (ref 0.57–1)
DEPRECATED RDW RBC AUTO: 43.8 FL (ref 37–54)
EGFRCR SERPLBLD CKD-EPI 2021: 59.6 ML/MIN/1.73
EGFRCR SERPLBLD CKD-EPI 2021: 69.7 ML/MIN/1.73
EOSINOPHIL # BLD AUTO: 0 10*3/MM3 (ref 0–0.4)
EOSINOPHIL NFR BLD AUTO: 0 % (ref 0.3–6.2)
ERYTHROCYTE [DISTWIDTH] IN BLOOD BY AUTOMATED COUNT: 14.3 % (ref 12.3–15.4)
GLOBULIN UR ELPH-MCNC: 2.5 GM/DL
GLOBULIN UR ELPH-MCNC: 3 GM/DL
GLUCOSE SERPL-MCNC: 128 MG/DL (ref 65–99)
GLUCOSE SERPL-MCNC: 87 MG/DL (ref 65–99)
HCT VFR BLD AUTO: 45.2 % (ref 34–46.6)
HGB BLD-MCNC: 14.8 G/DL (ref 12–15.9)
LYMPHOCYTES # BLD AUTO: 2.6 10*3/MM3 (ref 0.7–3.1)
LYMPHOCYTES NFR BLD AUTO: 11.7 % (ref 19.6–45.3)
MAGNESIUM SERPL-MCNC: 7.5 MG/DL (ref 1.6–2.6)
MAGNESIUM SERPL-MCNC: 8.2 MG/DL (ref 1.6–2.6)
MAGNESIUM SERPL-MCNC: 8.4 MG/DL (ref 1.6–2.6)
MCH RBC QN AUTO: 28.7 PG (ref 26.6–33)
MCHC RBC AUTO-ENTMCNC: 32.8 G/DL (ref 31.5–35.7)
MCV RBC AUTO: 87.6 FL (ref 79–97)
MONOCYTES # BLD AUTO: 0.9 10*3/MM3 (ref 0.1–0.9)
MONOCYTES NFR BLD AUTO: 4.1 % (ref 5–12)
NEUTROPHILS NFR BLD AUTO: 18.8 10*3/MM3 (ref 1.7–7)
NEUTROPHILS NFR BLD AUTO: 83.6 % (ref 42.7–76)
NRBC BLD AUTO-RTO: 0.1 /100 WBC (ref 0–0.2)
PLATELET # BLD AUTO: 153 10*3/MM3 (ref 140–450)
PMV BLD AUTO: 9.7 FL (ref 6–12)
POTASSIUM SERPL-SCNC: 4.9 MMOL/L (ref 3.5–5.2)
POTASSIUM SERPL-SCNC: 5.5 MMOL/L (ref 3.5–5.2)
PROT SERPL-MCNC: 4.4 G/DL (ref 6–8.5)
PROT SERPL-MCNC: 4.9 G/DL (ref 6–8.5)
RBC # BLD AUTO: 5.16 10*6/MM3 (ref 3.77–5.28)
RPR SER QL: NORMAL
SODIUM SERPL-SCNC: 128 MMOL/L (ref 136–145)
SODIUM SERPL-SCNC: 128 MMOL/L (ref 136–145)
WBC NRBC COR # BLD: 22.4 10*3/MM3 (ref 3.4–10.8)

## 2022-05-12 PROCEDURE — 25010000002 HYDRALAZINE PER 20 MG: Performed by: OBSTETRICS & GYNECOLOGY

## 2022-05-12 PROCEDURE — 76705 ECHO EXAM OF ABDOMEN: CPT

## 2022-05-12 PROCEDURE — 83735 ASSAY OF MAGNESIUM: CPT | Performed by: OBSTETRICS & GYNECOLOGY

## 2022-05-12 PROCEDURE — 63710000001 ONDANSETRON PER 8 MG: Performed by: OBSTETRICS & GYNECOLOGY

## 2022-05-12 PROCEDURE — 80053 COMPREHEN METABOLIC PANEL: CPT | Performed by: OBSTETRICS & GYNECOLOGY

## 2022-05-12 PROCEDURE — 85025 COMPLETE CBC W/AUTO DIFF WBC: CPT | Performed by: OBSTETRICS & GYNECOLOGY

## 2022-05-12 PROCEDURE — 0 MAGNESIUM SULFATE 20 GM/500ML SOLUTION: Performed by: OBSTETRICS & GYNECOLOGY

## 2022-05-12 RX ORDER — NIFEDIPINE 10 MG/1
10 CAPSULE ORAL EVERY 8 HOURS SCHEDULED
Status: DISCONTINUED | OUTPATIENT
Start: 2022-05-12 | End: 2022-05-12

## 2022-05-12 RX ORDER — LABETALOL HYDROCHLORIDE 5 MG/ML
10 INJECTION, SOLUTION INTRAVENOUS ONCE
Status: COMPLETED | OUTPATIENT
Start: 2022-05-12 | End: 2022-05-12

## 2022-05-12 RX ORDER — HYDRALAZINE HYDROCHLORIDE 20 MG/ML
5 INJECTION INTRAMUSCULAR; INTRAVENOUS
Status: COMPLETED | OUTPATIENT
Start: 2022-05-12 | End: 2022-05-12

## 2022-05-12 RX ORDER — ONDANSETRON 2 MG/ML
4 INJECTION INTRAMUSCULAR; INTRAVENOUS EVERY 8 HOURS PRN
Status: DISCONTINUED | OUTPATIENT
Start: 2022-05-12 | End: 2022-05-14 | Stop reason: HOSPADM

## 2022-05-12 RX ORDER — LABETALOL 100 MG/1
100 TABLET, FILM COATED ORAL 2 TIMES DAILY
Status: DISCONTINUED | OUTPATIENT
Start: 2022-05-12 | End: 2022-05-14

## 2022-05-12 RX ORDER — DILTIAZEM HYDROCHLORIDE 5 MG/ML
30 INJECTION INTRAVENOUS EVERY 6 HOURS
Status: DISCONTINUED | OUTPATIENT
Start: 2022-05-12 | End: 2022-05-12

## 2022-05-12 RX ORDER — LABETALOL HYDROCHLORIDE 5 MG/ML
INJECTION, SOLUTION INTRAVENOUS
Status: COMPLETED
Start: 2022-05-12 | End: 2022-05-12

## 2022-05-12 RX ADMIN — LABETALOL HYDROCHLORIDE 10 MG: 5 INJECTION INTRAVENOUS at 09:26

## 2022-05-12 RX ADMIN — PRENATAL VITAMINS-IRON FUMARATE 27 MG IRON-FOLIC ACID 0.8 MG TABLET 1 TABLET: at 09:07

## 2022-05-12 RX ADMIN — HYDRALAZINE HYDROCHLORIDE 5 MG: 20 INJECTION INTRAMUSCULAR; INTRAVENOUS at 20:22

## 2022-05-12 RX ADMIN — LABETALOL HYDROCHLORIDE 10 MG: 5 INJECTION, SOLUTION INTRAVENOUS at 09:26

## 2022-05-12 RX ADMIN — Medication 10 ML: at 20:45

## 2022-05-12 RX ADMIN — DOCUSATE SODIUM 100 MG: 100 CAPSULE, LIQUID FILLED ORAL at 09:07

## 2022-05-12 RX ADMIN — SODIUM CHLORIDE, SODIUM LACTATE, POTASSIUM CHLORIDE, AND CALCIUM CHLORIDE 75 ML/HR: 600; 310; 30; 20 INJECTION, SOLUTION INTRAVENOUS at 12:46

## 2022-05-12 RX ADMIN — ONDANSETRON HYDROCHLORIDE 4 MG: 4 TABLET, FILM COATED ORAL at 20:43

## 2022-05-12 RX ADMIN — HYDRALAZINE HYDROCHLORIDE 5 MG: 20 INJECTION INTRAMUSCULAR; INTRAVENOUS at 20:43

## 2022-05-12 RX ADMIN — NIFEDIPINE 10 MG: 10 CAPSULE ORAL at 13:15

## 2022-05-12 RX ADMIN — DOCUSATE SODIUM 100 MG: 100 CAPSULE, LIQUID FILLED ORAL at 20:22

## 2022-05-12 RX ADMIN — IBUPROFEN 600 MG: 600 TABLET, FILM COATED ORAL at 00:34

## 2022-05-12 RX ADMIN — MAGNESIUM SULFATE IN WATER 2 G/HR: 40 INJECTION, SOLUTION INTRAVENOUS at 05:04

## 2022-05-12 NOTE — NURSING NOTE
Pt bathroom smells heavily of cigarettes, no smell noted in room.  Pt denies smoking in bathroom. RN explained safety issues and instructed to not smoke in hospital. Pt verbalized understanding. Keely Hart RN informed.

## 2022-05-12 NOTE — PLAN OF CARE
Goal Outcome Evaluation:  Plan of Care Reviewed With: patient        Progress: improving   Pt delivered viable baby girl last night at 2207. Pt's blood pressure has improved and is currently still on magnesium infusion. Pt has had inadequate urine output this shift, MD aware. Pt has minimal complaints of pain since delivery. Will continue to monitor.

## 2022-05-12 NOTE — PLAN OF CARE
Goal Outcome Evaluation:  Plan of Care Reviewed With: patient        Progress: no change  Outcome Evaluation: Patient encouraged to drink more PO fluids and to bond with infant.  Pt has only asked for infant twice through the day when a family member came to visit and wanted to see infant.  Pt and her significant other have slept most of the day. PT ambulated in room twice today with RN encouragement.

## 2022-05-12 NOTE — CASE MANAGEMENT/SOCIAL WORK
Social Work Assessment  [facility]     Patient Name: Shima Acuña  MRN: [id number]  Today's Date: 5/12/2022    Admit Date: 5/11/2022     Discharge Plan     Row Name 05/12/22 1634       Plan    Plan Comments LSW met with patient at bedside re: +UDS amphetamines/methamphetamines, limited PNC. LSW met with patient at bedside. Patient denies knowing why her UDS would be positive, denies illicit substance use. Appears to have had one prenatal visit. LSW inquired about PNC and informed that she went to one visit with Dr. Green, but didn’t like him. Changed to new OB, Dr. Mclaughlin, but cancelled her appointment due to weather earlier in the year. States she called reschedule, but never received a call back. Inquired about having items for infant and patient states she might. Inquired about car seat and patient states her grandmother was purchasing one. Patient states she lives with her grandmother/spouse. States plan to formula feed. Missed appointment for [facility], due to being hospitalized for delivery. LSW to follow and have additional visit with patient on 5/13.           Met with patient in room wearing PPE: mask.Maintained distance greater than six feet and spent less than 15 minutes in the room.      MICK Das    Phone: [phone]  Cell: [phone]  Fax: [phone]  [email or web address]

## 2022-05-12 NOTE — PROGRESS NOTES
Postpartum day 1 controlled vaginal birth.    Severe preeclampsia with            Hypertension             Decreased renal function  Polysubstance abuse  Lack of prenatal care    Infant in nursery doing well    Magnesium level of 8.4 while on 2 g/h.  IV magnesium was discontinued until improvement in renal function    The patient is alert in bed.  Normal movements.  Normal reflexes.  Bleeding is scant.  Urine output is less than 30 cc/h    Labs reveal hyponatremia.  And elevated BUN and creatinine with a decreased glomerular filtration rate    Will limit IV fluids today but begin oral feeds.  Her hypotensive events have responded well to oral nifedipine and will continue to use that on an as-needed basis.  Repeat labs to document return of renal function

## 2022-05-12 NOTE — L&D DELIVERY NOTE
[facility]  Vaginal Delivery Note    Diagnosis     Patient is a 22 y.o. female  currently at 37w5d, who presents with spontaneous onset of labor and presumed rupture of membranes with lack of prenatal care.    Screening labs were negative for hepatitis B and C.  Negative for HIV.   .      Delivery     Delivery:  Spontaneous Vaginal Delivery    Date of Delivery:  2022   Anesthesia:      Delivering clinician: Cristhian Mclaughlin MD      Delivery narrative: Presented to labor and delivery with complaints of headache and onset of labor.  Was noted to be 4 cm dilated on admission.  Blood pressure was in the severe range as high as 170/120.  She had 3+ proteinuria brisk reflexes.  Liver enzymes were normal except for an elevated alkaline phosphatase.  Platelet counts were normal.  She did have an elevated creatinine at 1.28.  Urine drug screen was positive for methamphetamines.  Previous the drug screen was positive for methamphetamines and opioids    She did not have spontaneous rupture of membranes.  Received a single dose of morphine for the active phase of labor.  Normal progression through the active phase.    Pushed for approximately 30 minutes with a controlled delivery over an intact perineum.  The infant was bulb suction with delivery and handed to waiting respiratory therapy.  Infant had good color and tone and responded to stimulation    Placenta delivered spontaneously intact.  The uterus contracted well and lochia became scant.  There is a first-degree introital tear which did not require repair    Infant    Findings: VFI   Estimated fetal weight 3 pounds 14 ounces     Apgars:  6 and 8 at 1 and 5 minutes.           Placenta, Cord, and Fluid    Placenta delivered  spontaneous  clear   Episiotomy              n/a  Lacerations       1st degree   Estimated Blood Loss 300cc     Complications  hypertension            Cristhian Mclaughlin MD  22  22:22 EDT

## 2022-05-13 ENCOUNTER — APPOINTMENT (OUTPATIENT)
Dept: CARDIOLOGY | Facility: HOSPITAL | Age: 22
End: 2022-05-13

## 2022-05-13 LAB
ALBUMIN SERPL-MCNC: 1.6 G/DL (ref 3.5–5.2)
ALBUMIN/GLOB SERPL: 0.6 G/DL
ALP SERPL-CCNC: 183 U/L (ref 39–117)
ALT SERPL W P-5'-P-CCNC: 12 U/L (ref 1–33)
ANION GAP SERPL CALCULATED.3IONS-SCNC: 6 MMOL/L (ref 5–15)
AST SERPL-CCNC: 25 U/L (ref 1–32)
BH CV ECHO MEAS - ACS: 1.84 CM
BH CV ECHO MEAS - AO MAX PG: 7.5 MMHG
BH CV ECHO MEAS - AO MEAN PG: 5 MMHG
BH CV ECHO MEAS - AO ROOT DIAM: 2.8 CM
BH CV ECHO MEAS - AO V2 MAX: 136.5 CM/SEC
BH CV ECHO MEAS - AO V2 VTI: 29.1 CM
BH CV ECHO MEAS - AVA(I,D): 1.85 CM2
BH CV ECHO MEAS - EDV(CUBED): 105.9 ML
BH CV ECHO MEAS - EDV(MOD-SP4): 86.9 ML
BH CV ECHO MEAS - EF(MOD-BP): 51 %
BH CV ECHO MEAS - EF(MOD-SP4): 50.8 %
BH CV ECHO MEAS - ESV(CUBED): 41.3 ML
BH CV ECHO MEAS - ESV(MOD-SP4): 42.8 ML
BH CV ECHO MEAS - FS: 26.9 %
BH CV ECHO MEAS - IVS/LVPW: 1.16 CM
BH CV ECHO MEAS - IVSD: 0.85 CM
BH CV ECHO MEAS - LA DIMENSION(2D): 2.5 CM
BH CV ECHO MEAS - LV DIASTOLIC VOL/BSA (35-75): 54.3 CM2
BH CV ECHO MEAS - LV MASS(C)D: 121.6 GRAMS
BH CV ECHO MEAS - LV MAX PG: 4.2 MMHG
BH CV ECHO MEAS - LV MEAN PG: 2.5 MMHG
BH CV ECHO MEAS - LV SYSTOLIC VOL/BSA (12-30): 26.7 CM2
BH CV ECHO MEAS - LV V1 MAX: 102 CM/SEC
BH CV ECHO MEAS - LV V1 VTI: 16.9 CM
BH CV ECHO MEAS - LVIDD: 4.7 CM
BH CV ECHO MEAS - LVIDS: 3.5 CM
BH CV ECHO MEAS - LVOT AREA: 3.2 CM2
BH CV ECHO MEAS - LVOT DIAM: 2.01 CM
BH CV ECHO MEAS - LVPWD: 0.73 CM
BH CV ECHO MEAS - MV A MAX VEL: 65.4 CM/SEC
BH CV ECHO MEAS - MV DEC SLOPE: 479.9 CM/SEC2
BH CV ECHO MEAS - MV DEC TIME: 0.15 MSEC
BH CV ECHO MEAS - MV E MAX VEL: 70.8 CM/SEC
BH CV ECHO MEAS - MV E/A: 1.08
BH CV ECHO MEAS - MV MAX PG: 3.6 MMHG
BH CV ECHO MEAS - MV MEAN PG: 1.35 MMHG
BH CV ECHO MEAS - MV V2 VTI: 19.9 CM
BH CV ECHO MEAS - MVA(VTI): 2.7 CM2
BH CV ECHO MEAS - PA V2 MAX: 115.1 CM/SEC
BH CV ECHO MEAS - PI END-D VEL: 90.9 CM/SEC
BH CV ECHO MEAS - RVDD: 1.41 CM
BH CV ECHO MEAS - SI(MOD-SP4): 27.6 ML/M2
BH CV ECHO MEAS - SV(LVOT): 53.8 ML
BH CV ECHO MEAS - SV(MOD-SP4): 44.2 ML
BH CV ECHO MEAS - TR MAX PG: 0.15 MMHG
BH CV ECHO MEAS - TR MAX VEL: 19.2 CM/SEC
BILIRUB SERPL-MCNC: <0.2 MG/DL (ref 0–1.2)
BUN SERPL-MCNC: 26 MG/DL (ref 6–20)
BUN/CREAT SERPL: 21.7 (ref 7–25)
CALCIUM SPEC-SCNC: 6.3 MG/DL (ref 8.6–10.5)
CHLORIDE SERPL-SCNC: 106 MMOL/L (ref 98–107)
CO2 SERPL-SCNC: 20 MMOL/L (ref 22–29)
CREAT SERPL-MCNC: 1.2 MG/DL (ref 0.57–1)
EGFRCR SERPLBLD CKD-EPI 2021: 66.2 ML/MIN/1.73
GLOBULIN UR ELPH-MCNC: 2.5 GM/DL
GLUCOSE SERPL-MCNC: 80 MG/DL (ref 65–99)
MAXIMAL PREDICTED HEART RATE: 199 BPM
NT-PROBNP SERPL-MCNC: 1640 PG/ML (ref 0–450)
POTASSIUM SERPL-SCNC: 5.2 MMOL/L (ref 3.5–5.2)
PROT SERPL-MCNC: 4.1 G/DL (ref 6–8.5)
RUBV IGG SERPL IA-ACNC: NORMAL
SODIUM SERPL-SCNC: 132 MMOL/L (ref 136–145)
STRESS TARGET HR: 169 BPM

## 2022-05-13 PROCEDURE — 90471 IMMUNIZATION ADMIN: CPT | Performed by: OBSTETRICS & GYNECOLOGY

## 2022-05-13 PROCEDURE — 25010000002 HYDRALAZINE PER 20 MG: Performed by: INTERNAL MEDICINE

## 2022-05-13 PROCEDURE — 83880 ASSAY OF NATRIURETIC PEPTIDE: CPT | Performed by: NURSE PRACTITIONER

## 2022-05-13 PROCEDURE — 93010 ELECTROCARDIOGRAM REPORT: CPT | Performed by: INTERNAL MEDICINE

## 2022-05-13 PROCEDURE — 80053 COMPREHEN METABOLIC PANEL: CPT | Performed by: NURSE PRACTITIONER

## 2022-05-13 PROCEDURE — 99222 1ST HOSP IP/OBS MODERATE 55: CPT | Performed by: INTERNAL MEDICINE

## 2022-05-13 PROCEDURE — 93306 TTE W/DOPPLER COMPLETE: CPT | Performed by: INTERNAL MEDICINE

## 2022-05-13 PROCEDURE — 93005 ELECTROCARDIOGRAM TRACING: CPT | Performed by: INTERNAL MEDICINE

## 2022-05-13 PROCEDURE — 90715 TDAP VACCINE 7 YRS/> IM: CPT | Performed by: OBSTETRICS & GYNECOLOGY

## 2022-05-13 PROCEDURE — 93306 TTE W/DOPPLER COMPLETE: CPT

## 2022-05-13 PROCEDURE — 25010000002 TETANUS-DIPHTH-ACELL PERTUSSIS 5-2.5-18.5 LF-MCG/0.5 SUSPENSION PREFILLED SYRINGE: Performed by: OBSTETRICS & GYNECOLOGY

## 2022-05-13 RX ORDER — HYDRALAZINE HYDROCHLORIDE 20 MG/ML
10 INJECTION INTRAMUSCULAR; INTRAVENOUS EVERY 6 HOURS PRN
Status: DISCONTINUED | OUTPATIENT
Start: 2022-05-13 | End: 2022-05-14 | Stop reason: HOSPADM

## 2022-05-13 RX ADMIN — DILTIAZEM HYDROCHLORIDE 30 MG: 30 TABLET, FILM COATED ORAL at 13:26

## 2022-05-13 RX ADMIN — LABETALOL HYDROCHLORIDE 100 MG: 100 TABLET, FILM COATED ORAL at 08:36

## 2022-05-13 RX ADMIN — HYDRALAZINE HYDROCHLORIDE 10 MG: 20 INJECTION INTRAMUSCULAR; INTRAVENOUS at 02:37

## 2022-05-13 RX ADMIN — TETANUS TOXOID, REDUCED DIPHTHERIA TOXOID AND ACELLULAR PERTUSSIS VACCINE, ADSORBED 0.5 ML: 5; 2.5; 8; 8; 2.5 SUSPENSION INTRAMUSCULAR at 00:07

## 2022-05-13 RX ADMIN — LABETALOL HYDROCHLORIDE 100 MG: 100 TABLET, FILM COATED ORAL at 21:10

## 2022-05-13 RX ADMIN — DILTIAZEM HYDROCHLORIDE 30 MG: 30 TABLET, FILM COATED ORAL at 23:40

## 2022-05-13 RX ADMIN — DILTIAZEM HYDROCHLORIDE 30 MG: 30 TABLET, FILM COATED ORAL at 00:56

## 2022-05-13 RX ADMIN — DOCUSATE SODIUM 100 MG: 100 CAPSULE, LIQUID FILLED ORAL at 08:36

## 2022-05-13 RX ADMIN — DILTIAZEM HYDROCHLORIDE 30 MG: 30 TABLET, FILM COATED ORAL at 05:17

## 2022-05-13 RX ADMIN — DOCUSATE SODIUM 100 MG: 100 CAPSULE, LIQUID FILLED ORAL at 21:11

## 2022-05-13 RX ADMIN — DILTIAZEM HYDROCHLORIDE 30 MG: 30 TABLET, FILM COATED ORAL at 18:43

## 2022-05-13 RX ADMIN — PRENATAL VITAMINS-IRON FUMARATE 27 MG IRON-FOLIC ACID 0.8 MG TABLET 1 TABLET: at 08:36

## 2022-05-13 NOTE — PLAN OF CARE
Goal Outcome Evaluation:   Patient transferred to FAISAL room 264. A/O x4. Not current complaints of pain. Patient due to void by 0200. Patient hypertensive with a current BP of 139/103. All other vitals stable on room air. Call light within reach. Patient educated to press call light for assistance and verbalized understanding.     Problem: Adult Inpatient Plan of Care  Goal: Plan of Care Review  Outcome: Ongoing, Progressing  Goal: Patient-Specific Goal (Individualized)  Outcome: Ongoing, Progressing  Goal: Absence of Hospital-Acquired Illness or Injury  Outcome: Ongoing, Progressing  Intervention: Identify and Manage Fall Risk  Recent Flowsheet Documentation  Taken 5/13/2022 0033 by Ирина Palencia RN  Safety Promotion/Fall Prevention:   safety round/check completed   activity supervised   assistive device/personal items within reach   clutter free environment maintained   nonskid shoes/slippers when out of bed   room organization consistent  Intervention: Prevent Infection  Recent Flowsheet Documentation  Taken 5/13/2022 0033 by Ирина Palencia RN  Infection Prevention: environmental surveillance performed  Goal: Optimal Comfort and Wellbeing  Outcome: Ongoing, Progressing  Intervention: Provide Person-Centered Care  Recent Flowsheet Documentation  Taken 5/13/2022 0033 by Ирина Palencia RN  Trust Relationship/Rapport: thoughts/feelings acknowledged  Goal: Readiness for Transition of Care  Outcome: Ongoing, Progressing     Problem: Adjustment to Role Transition (Postpartum Vaginal Delivery)  Goal: Successful Maternal Role Transition  Outcome: Ongoing, Progressing  Intervention: Support Maternal Role Transition  Recent Flowsheet Documentation  Taken 5/13/2022 0033 by Ирина Palencia RN  Supportive Measures: active listening utilized     Problem: Bleeding (Postpartum Vaginal Delivery)  Goal: Hemostasis  Outcome: Ongoing, Progressing     Problem: Infection (Postpartum Vaginal Delivery)  Goal: Absence of  Infection Signs/Symptoms  Outcome: Ongoing, Progressing     Problem: Pain (Postpartum Vaginal Delivery)  Goal: Acceptable Pain Control  Outcome: Ongoing, Progressing     Problem: Urinary Retention (Postpartum Vaginal Delivery)  Goal: Effective Urinary Elimination  Outcome: Ongoing, Progressing

## 2022-05-13 NOTE — CASE MANAGEMENT/SOCIAL WORK
Social Work Assessment  [facility]     Patient Name: Shima Acuña  MRN: [id number]  Today's Date: 5/13/2022    Admit Date: 5/11/2022     Psychosocial     Row Name 05/13/22 1734       Coping/Stress    Major Change/Loss/Stressor birth    Coping/Stress Comments Attempted follow-up with patient at bedside re: plan for infant. Patient’s significant other and mother at bedside. Patient made limited contact with LSW. Appears patient is between homes of living with her mother and grandmother. Attempted to inquire further, but patient fixated on being too warm in the room to have addition conversations. Inquired if patient wanted to see infant and patient looked to FOB, who appeared to lean either way. LSW did not pursue this further.           Met with patient in room wearing PPE: mask.  Maintained distance greater than six feet and spent less than 15 minutes in the room.    MICK Das    Phone: [phone]  Cell: [phone]  Fax: [phone]  [email or web address]

## 2022-05-13 NOTE — CASE MANAGEMENT/SOCIAL WORK
Discharge Planning Assessment   [facility]     Patient Name: Shima Acuña  MRN: [id number]  Today's Date: 2022    Admit Date: 2022     Discharge Needs Assessment     Row Name 22 1239       Living Environment    People in Home grandparent(s)    Name(s) of People in Home Lives w/grandma    Current Living Arrangements home    Primary Care Provided by self    Provides Primary Care For other (see comments)  Just delivered     Family Caregiver if Needed grandparent(s)    Able to Return to Prior Arrangements yes    Living Arrangement Comments Lives w/grandma       Resource/Environmental Concerns    Resource/Environmental Concerns --  SW has met w/patient    Transportation Concerns none       Transition Planning    Patient/Family Anticipates Transition to home    Patient/Family Anticipated Services at Transition none    Transportation Anticipated family or friend will provide       Discharge Needs Assessment    Readmission Within the Last 30 Days no previous admission in last 30 days    Equipment Currently Used at Home none    Concerns to be Addressed discharge planning    Anticipated Changes Related to Illness none    Equipment Needed After Discharge none    Provided Post Acute Provider List? N/A    Provided Post Acute Provider Quality & Resource List? N/A               Discharge Plan     Row Name 22 1243       Plan    Plan Anticipates home. Lives w/grandmother. Barrier: Gave birth to  on 22.    Plan Comments CM to patient's room to complete initial assessment. Patient lives with her grandmother and states her boyfriend will transport her home. No DME. IADL. Does not work, but does drive. Home pharmacy is [facility], but is agreeable to Meds to Bed. CM updated pharmacy. Patient states she does not have PCP, but agreeable to allowing this CM to schedule new PCP appointment. CM coordinated appointment and updated epic with PCP appointment date/time/instructions.              Continued  Care and Services - Admitted Since 5/11/2022    Coordination has not been started for this encounter.       Expected Discharge Date and Time     Expected Discharge Date Expected Discharge Time    May 16, 2022          Demographic Summary     Row Name 05/13/22 1237       General Information    Admission Type inpatient    Arrived From other (see comments)  OBGYN delivery    Reason for Consult discharge planning    Preferred Language English       Contact Information    Permission Granted to Share Info With                Functional Status     Row Name 05/13/22 1238       Functional Status    Usual Activity Tolerance good    Current Activity Tolerance good       Functional Status, IADL    Medications independent    Meal Preparation independent    Housekeeping independent    Laundry independent    Shopping independent       Mental Status    General Appearance WDL WDL       Mental Status Summary    Recent Changes in Mental Status/Cognitive Functioning no changes       Employment/    Employment Status unemployed                   Met with patient in room wearing PPE: mask, face shield/goggles, gloves, gown.      Maintained distance greater than six feet and spent less than 15 minutes in the room.    Serena Miner RN, MSN  Care Manager  [phone]

## 2022-05-13 NOTE — PLAN OF CARE
Problem: Adult Inpatient Plan of Care  Goal: Plan of Care Review  Outcome: Ongoing, Progressing  Goal: Patient-Specific Goal (Individualized)  Outcome: Ongoing, Progressing  Goal: Absence of Hospital-Acquired Illness or Injury  Outcome: Ongoing, Progressing  Intervention: Identify and Manage Fall Risk  Recent Flowsheet Documentation  Taken 5/13/2022 1630 by Marlin Powell RN  Safety Promotion/Fall Prevention:   assistive device/personal items within reach   clutter free environment maintained   nonskid shoes/slippers when out of bed   room organization consistent   safety round/check completed  Taken 5/13/2022 1600 by Marlin Powell RN  Safety Promotion/Fall Prevention: safety round/check completed  Taken 5/13/2022 1400 by Marlin Powell RN  Safety Promotion/Fall Prevention: safety round/check completed  Taken 5/13/2022 1230 by Marlin Powell RN  Safety Promotion/Fall Prevention:   assistive device/personal items within reach   clutter free environment maintained   nonskid shoes/slippers when out of bed   room organization consistent   safety round/check completed  Taken 5/13/2022 1200 by Marlin Powell RN  Safety Promotion/Fall Prevention: safety round/check completed  Taken 5/13/2022 1000 by Marlin Powell RN  Safety Promotion/Fall Prevention: safety round/check completed  Taken 5/13/2022 0830 by Marlin Powell RN  Safety Promotion/Fall Prevention:   assistive device/personal items within reach   clutter free environment maintained   nonskid shoes/slippers when out of bed   room organization consistent   safety round/check completed  Taken 5/13/2022 0800 by Marlin Powell RN  Safety Promotion/Fall Prevention: safety round/check completed  Intervention: Prevent Skin Injury  Recent Flowsheet Documentation  Taken 5/13/2022 1630 by Marlin Powell RN  Body Position: position changed independently  Taken 5/13/2022 1230 by Marlin Powell RN  Body Position: position changed  independently  Taken 5/13/2022 0830 by Marlin Powell RN  Body Position: position changed independently  Intervention: Prevent and Manage VTE (Venous Thromboembolism) Risk  Recent Flowsheet Documentation  Taken 5/13/2022 1630 by Marlin Powell RN  Activity Management:   activity adjusted per tolerance   activity encouraged  Taken 5/13/2022 1230 by Marlin Powell RN  Activity Management:   activity adjusted per tolerance   activity encouraged  Taken 5/13/2022 0830 by Marlin Powell RN  Activity Management:   activity adjusted per tolerance   activity encouraged  Intervention: Prevent Infection  Recent Flowsheet Documentation  Taken 5/13/2022 1630 by Marlin Powell RN  Infection Prevention: hand hygiene promoted  Taken 5/13/2022 1230 by Marlin Powell RN  Infection Prevention: hand hygiene promoted  Taken 5/13/2022 0830 by Marlin Powell RN  Infection Prevention: hand hygiene promoted  Goal: Optimal Comfort and Wellbeing  Outcome: Ongoing, Progressing  Goal: Readiness for Transition of Care  Outcome: Ongoing, Progressing     Problem: Adjustment to Role Transition (Postpartum Vaginal Delivery)  Goal: Successful Maternal Role Transition  Outcome: Ongoing, Progressing     Problem: Bleeding (Postpartum Vaginal Delivery)  Goal: Hemostasis  Outcome: Ongoing, Progressing     Problem: Infection (Postpartum Vaginal Delivery)  Goal: Absence of Infection Signs/Symptoms  Outcome: Ongoing, Progressing     Problem: Pain (Postpartum Vaginal Delivery)  Goal: Acceptable Pain Control  Outcome: Ongoing, Progressing     Problem: Urinary Retention (Postpartum Vaginal Delivery)  Goal: Effective Urinary Elimination  Outcome: Ongoing, Progressing   Goal Outcome Evaluation:      Patient had a echocardiogram today. The patient did not have any new tests or procedures. The patient's blood pressure was controlled during the shift. The patient did not have any complaints of pain and remains on room air. Will continue  to monitor.

## 2022-05-13 NOTE — NURSING NOTE
Pt will be transferred to cardiac floor per Dr. Montgomery for hypertension emergency management. Hold cardizem and Labetalol until pt is transferred and on cardiac monitor per Dr. Montanez. Magnesium sulfate stopped at 2145 and f/c removed. Pt ambulated to restroom to void and clean and began to void black/red coffee ground/ mucus emesis. Dr. Montanez notifed. Stat CMP and RUQ US ordered.

## 2022-05-13 NOTE — CONSULTS
CARDIOLOGY CONSULT NOTE      Referring Provider: Dr. Mclaughlin    Reason for consultation:    Postpartum hypertension      Attending: Cristhian Mclaughlin MD    Chief complaint    Headaches  Shortness of breath    Subjective .     History of present illness:    Patient is 22-year-old white female whose past medical history is significant for drug abuse, who was pregnant and was admitted with eclampsia.  Patient underwent delivery day before yesterday.  Cardiology consultation is requested for management of hypertension.    Patient was admitted with preeclampsia and was started on intravenous magnesium.  Patient underwent controlled vaginal birth.  Postpartum her blood pressure was still elevated.  She complain of mild shortness of breath.    Patient denies any chest pain or tightness or heaviness.  Patient denies any orthopnea PND.  No significant leg edema noted.  Trace leg edema present.    Patient has history of amphetamine abuse.  Patient would not feed baby.  I have discussed with OB/GYN team.    Review of system:    Review of Systems   Constitutional: Negative for chills and fever.   HENT: Negative for ear discharge and nosebleeds.    Eyes: Negative for discharge and redness.   Cardiovascular: Negative for chest pain, orthopnea, palpitations, paroxysmal nocturnal dyspnea and syncope.   Respiratory: Positive for shortness of breath. Negative for cough and wheezing.    Endocrine: Negative for heat intolerance.   Skin: Negative for rash.   Musculoskeletal: Negative for arthritis and myalgias.   Gastrointestinal: Negative for abdominal pain, melena, nausea and vomiting.   Genitourinary: Negative for dysuria and hematuria.   Neurological: Negative for dizziness, light-headedness, numbness and tremors.   Psychiatric/Behavioral: Negative for depression. The patient is not nervous/anxious.          History  Past Medical History:   Diagnosis Date   • Clavicle fracture        History reviewed. No pertinent surgical  "history.    No family history on file.    Social History     Tobacco Use   • Smoking status: Former Smoker     Quit date: 10/16/2021     Years since quittin.5   • Smokeless tobacco: Never Used   Substance Use Topics   • Alcohol use: Not Currently   • Drug use: Not Currently        Medications Prior to Admission   Medication Sig Dispense Refill Last Dose   • prenatal vitamin (prenatal, CLASSIC, vitamin) tablet Take 1 tablet by mouth Daily.   2022 at Unknown time         Patient has no known allergies.    Scheduled Meds:dilTIAZem, 30 mg, Oral, Q6H  docusate sodium, 100 mg, Oral, BID  labetalol, 100 mg, Oral, BID  lidocaine PF 1%, 30 mL, Infiltration, Once  prenatal vitamin, 1 tablet, Oral, Daily      Continuous Infusions:lactated ringers, 125 mL/hr, Last Rate: Stopped (22)  magnesium sulfate, 0.5 g/hr, Last Rate: Stopped (22)      PRN Meds:.•  benzocaine-menthol  •  bisacodyl  •  hydrALAZINE  •  HYDROcodone-acetaminophen  •  Hydrocort-Pramoxine (Perianal)  •  ibuprofen  •  magnesium hydroxide  •  Measles, Mumps & Rubella Vac  •  Morphine  •  ondansetron  •  ondansetron  •  witch hazel-glycerin    Objective     VITAL SIGNS  Vitals:    22 0700 22 1003 22 1418 22 1436   BP: 117/71 99/66 130/72 130/72   BP Location:  Right arm Right arm    Patient Position:  Lying Lying    Pulse: 105 78 85    Resp:  13 14    Temp:  98 °F (36.7 °C)     TempSrc:  Oral     SpO2: 93% 94%     Weight:    59.9 kg (132 lb)   Height:    157.5 cm (62\")       Flowsheet Rows    Flowsheet Row First Filed Value   Admission Height 157.5 cm (62\") Documented at 2022   Admission Weight 55.1 kg (121 lb 7.6 oz) Documented at 2022          Body mass index is 24.14 kg/m².     TELEMETRY: Sinus rhythm    Physical exam:    Head: Atraumatic, normocephalic  Eyes: No conjunctival injection or redness noted.  No discharge  Neck: No JVD, no lymphadenopathy or carotid bruit  Chest: No obvious " deformity noted  Lungs: Air entry is present in all lung zones.  Decreased breath sounds at the bases.  No crackles or rhonchi  Heart: Normal S1 and S2.  No pericardial rub or murmur  Neuro: Neurological exam was grossly intact.  No focal deficit.  Psychiatric: Patient appears to be emotionally stable.  No depression or anxiety noted  Extremities: Trace leg edema noted         Results Review:   I reviewed the patient's new clinical results.    CBC    Results from last 7 days   Lab Units 05/12/22 0505 05/11/22  1739   WBC 10*3/mm3 22.40* 13.60*   HEMOGLOBIN g/dL 14.8 15.4   PLATELETS 10*3/mm3 153 181     BMP   Results from last 7 days   Lab Units 05/13/22 1322 05/12/22 2257 05/12/22  1703 05/12/22  1208 05/12/22  0505 05/11/22  1739   SODIUM mmol/L 132* 128*  --   --  128* 135*   POTASSIUM mmol/L 5.2 5.5*  --   --  4.9 4.6   CHLORIDE mmol/L 106 102  --   --  101 106   CO2 mmol/L 20.0* 16.0*  --   --  16.0* 18.0*   BUN mg/dL 26* 25*  --   --  24* 22*   CREATININE mg/dL 1.20* 1.15*  --   --  1.31* 1.28*   GLUCOSE mg/dL 80 87  --   --  128* 78   MAGNESIUM mg/dL  --   --  7.5* 8.2* 8.4*  --      Cr Clearance Estimated Creatinine Clearance: 70.1 mL/min (A) (by C-G formula based on SCr of 1.2 mg/dL (H)).  Coag     HbA1C No results found for: HGBA1C  Blood Glucose No results found for: POCGLU  Infection   Results from last 7 days   Lab Units 05/11/22  1924   URINECX  No growth     CMP   Results from last 7 days   Lab Units 05/13/22 1322 05/12/22 2257 05/12/22  0505 05/11/22  1739   SODIUM mmol/L 132* 128* 128* 135*   POTASSIUM mmol/L 5.2 5.5* 4.9 4.6   CHLORIDE mmol/L 106 102 101 106   CO2 mmol/L 20.0* 16.0* 16.0* 18.0*   BUN mg/dL 26* 25* 24* 22*   CREATININE mg/dL 1.20* 1.15* 1.31* 1.28*   GLUCOSE mg/dL 80 87 128* 78   ALBUMIN g/dL 1.60* 1.90* 1.90* 2.00*   BILIRUBIN mg/dL <0.2 <0.2 0.2 0.2   ALK PHOS U/L 183* 224* 254* 292*   AST (SGOT) U/L 25 33* 37* 29   ALT (SGPT) U/L 12 16 16 12     ABG      UA    Results from  last 7 days   Lab Units 05/11/22  1924   NITRITE UA  Negative   WBC UA /HPF 6-12*   BACTERIA UA /HPF Trace*   SQUAM EPITHEL UA /HPF 3-6*   URINECX  No growth     TRUMAN  No results found for: POCMETH, POCAMPHET, POCBARBITUR, POCBENZO, POCCOCAINE, POCOPIATES, POCOXYCODO, POCPHENCYC, POCPROPOXY, POCTHC, POCTRICYC  Lysis Labs   Results from last 7 days   Lab Units 05/13/22  1322 05/12/22  2257 05/12/22  0505 05/11/22  1739   HEMOGLOBIN g/dL  --   --  14.8 15.4   PLATELETS 10*3/mm3  --   --  153 181   CREATININE mg/dL 1.20* 1.15* 1.31* 1.28*     Radiology(recent) US Abdomen Limited    Result Date: 5/12/2022  Large amount of relatively simple-appearing free fluid in the right upper abdomen, nonspecific. This could be further evaluated with CT as clinically indicated. Partially contracted gallbladder with circumferential wall thickening and a small amount of adjacent fluid. The wall thickening could be due to contraction at the time of imaging, though is nonspecific. No stones seen in the gallbladder. Electronically signed by:  Pierce Macias M.D.  5/12/2022 9:30 PM            Imaging Results (Last 24 Hours)     Procedure Component Value Units Date/Time    US Abdomen Limited [[id number]] Collected: 05/12/22 2327     Updated: 05/12/22 2331    Narrative:      EXAMINATION: RIGHT UPPER QUADRANT ABDOMINAL ULTRASOUND      INDICATION:Right upper quadrant pain, bloody emesis     COMPARISON: No prior ultrasound for comparison.    TECHNIQUE:  Multiple gray scale and color doppler ultrasound images were obtained of the right upper quadrant.     FINDINGS:     Liver: No focal liver lesions are seen.  No intrahepatic biliary dilatation seen.     Gallbladder:  Partially contracted gallbladder with circumferential wall thickening and a small amount of adjacent fluid. No stones seen in the gallbladder. Common bile duct caliber is within normal limits.     Pancreas: No visible abnormality.     Right Kidney: No sonographic evidence of acute  hydronephrosis, mass lesion, or stone.     There is a large amount of relatively simple-appearing free fluid in the right upper abdomen.      Impression:        Large amount of relatively simple-appearing free fluid in the right upper abdomen, nonspecific. This could be further evaluated with CT as clinically indicated.    Partially contracted gallbladder with circumferential wall thickening and a small amount of adjacent fluid. The wall thickening could be due to contraction at the time of imaging, though is nonspecific. No stones seen in the gallbladder.    Electronically signed by:  Pierce Macias M.D.    5/12/2022 9:30 PM          EKG            I personally viewed and interpreted the patient's EKG/Telemetry data:    ECHOCARDIOGRAM:      STRESS MYOVIEW:    CARDIAC CATHETERIZATION:    OTHER:         Assessment & Plan       Pregnant and not yet delivered      Assessment:    Hypertensive urgency     -Proved with initiation of medical therapy  Status post vaginal delivery  Hyponatremia    Patient is seen and examined and findings are verified.  All data is reviewed by me personally.  Assessment and plan formulated by APC was done after discussion with attending.  I spent more than 50% of time in taking care of the patient.    MDM:    1.  Hypertension:    Patient had postpartum hypertension.  I would recommend to start Cardizem and labetalol.  I chose labetalol because patient has history of amphetamine abuse.  Selective beta-blocker would be unwarranted.  Labetalol as both beta-blocker and alpha-blocker effects.    2.  Electrolyte abnormalities:    I would leave that decision to the primary team.    Electronically signed by Roger Montgomery MD, 05/13/22, 6:14 PM EDT.    Roger Montgomery MD  05/13/22  18:14 EDT

## 2022-05-13 NOTE — NURSING NOTE
Dr. Montgomery paged about patients DBP consistently over 100. See orders for PRN BP meds. Will continue to monitor BP per unit protocol.

## 2022-05-14 ENCOUNTER — APPOINTMENT (OUTPATIENT)
Dept: GENERAL RADIOLOGY | Facility: HOSPITAL | Age: 22
End: 2022-05-14

## 2022-05-14 VITALS
TEMPERATURE: 98.4 F | OXYGEN SATURATION: 94 % | WEIGHT: 132 LBS | HEART RATE: 62 BPM | SYSTOLIC BLOOD PRESSURE: 157 MMHG | HEIGHT: 62 IN | DIASTOLIC BLOOD PRESSURE: 99 MMHG | RESPIRATION RATE: 13 BRPM | BODY MASS INDEX: 24.29 KG/M2

## 2022-05-14 LAB
ALBUMIN SERPL-MCNC: 1.8 G/DL (ref 3.5–5.2)
ALBUMIN/GLOB SERPL: 0.8 G/DL
ALP SERPL-CCNC: 167 U/L (ref 39–117)
ALT SERPL W P-5'-P-CCNC: 13 U/L (ref 1–33)
ANION GAP SERPL CALCULATED.3IONS-SCNC: 8 MMOL/L (ref 5–15)
AST SERPL-CCNC: 22 U/L (ref 1–32)
BASOPHILS # BLD AUTO: 0.1 10*3/MM3 (ref 0–0.2)
BASOPHILS NFR BLD AUTO: 0.4 % (ref 0–1.5)
BILIRUB SERPL-MCNC: <0.2 MG/DL (ref 0–1.2)
BUN SERPL-MCNC: 15 MG/DL (ref 6–20)
BUN/CREAT SERPL: 18.3 (ref 7–25)
CA-I SERPL ISE-MCNC: 1.12 MMOL/L (ref 1.2–1.3)
CALCIUM SPEC-SCNC: 7.3 MG/DL (ref 8.6–10.5)
CHLORIDE SERPL-SCNC: 107 MMOL/L (ref 98–107)
CO2 SERPL-SCNC: 20 MMOL/L (ref 22–29)
CREAT SERPL-MCNC: 0.82 MG/DL (ref 0.57–1)
CRP SERPL-MCNC: 0.33 MG/DL (ref 0–0.5)
DEPRECATED RDW RBC AUTO: 46.4 FL (ref 37–54)
EGFRCR SERPLBLD CKD-EPI 2021: 104.5 ML/MIN/1.73
EOSINOPHIL # BLD AUTO: 0.2 10*3/MM3 (ref 0–0.4)
EOSINOPHIL NFR BLD AUTO: 2.1 % (ref 0.3–6.2)
ERYTHROCYTE [DISTWIDTH] IN BLOOD BY AUTOMATED COUNT: 14.9 % (ref 12.3–15.4)
ERYTHROCYTE [SEDIMENTATION RATE] IN BLOOD: 24 MM/HR (ref 0–20)
GLOBULIN UR ELPH-MCNC: 2.3 GM/DL
GLUCOSE SERPL-MCNC: 92 MG/DL (ref 65–99)
HCT VFR BLD AUTO: 36.1 % (ref 34–46.6)
HGB BLD-MCNC: 11.7 G/DL (ref 12–15.9)
LYMPHOCYTES # BLD AUTO: 2.9 10*3/MM3 (ref 0.7–3.1)
LYMPHOCYTES NFR BLD AUTO: 24 % (ref 19.6–45.3)
MAGNESIUM SERPL-MCNC: 2.7 MG/DL (ref 1.6–2.6)
MCH RBC QN AUTO: 28.8 PG (ref 26.6–33)
MCHC RBC AUTO-ENTMCNC: 32.4 G/DL (ref 31.5–35.7)
MCV RBC AUTO: 88.9 FL (ref 79–97)
MONOCYTES # BLD AUTO: 0.7 10*3/MM3 (ref 0.1–0.9)
MONOCYTES NFR BLD AUTO: 6 % (ref 5–12)
NEUTROPHILS NFR BLD AUTO: 67.5 % (ref 42.7–76)
NEUTROPHILS NFR BLD AUTO: 8.1 10*3/MM3 (ref 1.7–7)
NRBC BLD AUTO-RTO: 0.3 /100 WBC (ref 0–0.2)
PLATELET # BLD AUTO: 163 10*3/MM3 (ref 140–450)
PMV BLD AUTO: 8.9 FL (ref 6–12)
POTASSIUM SERPL-SCNC: 4.7 MMOL/L (ref 3.5–5.2)
PROCALCITONIN SERPL-MCNC: 0.59 NG/ML (ref 0–0.25)
PROT SERPL-MCNC: 4.1 G/DL (ref 6–8.5)
RBC # BLD AUTO: 4.06 10*6/MM3 (ref 3.77–5.28)
SODIUM SERPL-SCNC: 135 MMOL/L (ref 136–145)
WBC NRBC COR # BLD: 12 10*3/MM3 (ref 3.4–10.8)

## 2022-05-14 PROCEDURE — 71045 X-RAY EXAM CHEST 1 VIEW: CPT

## 2022-05-14 PROCEDURE — 85652 RBC SED RATE AUTOMATED: CPT | Performed by: INTERNAL MEDICINE

## 2022-05-14 PROCEDURE — 25010000002 CALCIUM GLUCONATE-NACL 1-0.675 GM/50ML-% SOLUTION: Performed by: INTERNAL MEDICINE

## 2022-05-14 PROCEDURE — 85025 COMPLETE CBC W/AUTO DIFF WBC: CPT | Performed by: INTERNAL MEDICINE

## 2022-05-14 PROCEDURE — 99232 SBSQ HOSP IP/OBS MODERATE 35: CPT | Performed by: INTERNAL MEDICINE

## 2022-05-14 PROCEDURE — 82330 ASSAY OF CALCIUM: CPT | Performed by: INTERNAL MEDICINE

## 2022-05-14 PROCEDURE — 83735 ASSAY OF MAGNESIUM: CPT | Performed by: INTERNAL MEDICINE

## 2022-05-14 PROCEDURE — 25010000002 CALCIUM GLUCONATE 2-0.675 GM/100ML-% SOLUTION: Performed by: INTERNAL MEDICINE

## 2022-05-14 PROCEDURE — 84145 PROCALCITONIN (PCT): CPT | Performed by: INTERNAL MEDICINE

## 2022-05-14 PROCEDURE — 80053 COMPREHEN METABOLIC PANEL: CPT | Performed by: INTERNAL MEDICINE

## 2022-05-14 PROCEDURE — 99223 1ST HOSP IP/OBS HIGH 75: CPT | Performed by: INTERNAL MEDICINE

## 2022-05-14 PROCEDURE — 86140 C-REACTIVE PROTEIN: CPT | Performed by: INTERNAL MEDICINE

## 2022-05-14 RX ORDER — DILTIAZEM HYDROCHLORIDE 180 MG/1
180 CAPSULE, COATED, EXTENDED RELEASE ORAL
Status: DISCONTINUED | OUTPATIENT
Start: 2022-05-14 | End: 2022-05-14 | Stop reason: HOSPADM

## 2022-05-14 RX ORDER — DILTIAZEM HYDROCHLORIDE 180 MG/1
180 CAPSULE, COATED, EXTENDED RELEASE ORAL
Qty: 30 CAPSULE | Refills: 0 | Status: SHIPPED | OUTPATIENT
Start: 2022-05-14 | End: 2022-06-07 | Stop reason: SDUPTHER

## 2022-05-14 RX ORDER — HYDROCODONE BITARTRATE AND ACETAMINOPHEN 5; 325 MG/1; MG/1
1 TABLET ORAL EVERY 4 HOURS PRN
Qty: 14 TABLET | Refills: 0 | Status: SHIPPED | OUTPATIENT
Start: 2022-05-14 | End: 2022-05-18

## 2022-05-14 RX ORDER — CALCIUM GLUCONATE 20 MG/ML
1 INJECTION, SOLUTION INTRAVENOUS ONCE
Status: COMPLETED | OUTPATIENT
Start: 2022-05-14 | End: 2022-05-14

## 2022-05-14 RX ORDER — LABETALOL 200 MG/1
200 TABLET, FILM COATED ORAL 2 TIMES DAILY
Qty: 60 TABLET | Refills: 0 | Status: SHIPPED | OUTPATIENT
Start: 2022-05-14 | End: 2022-06-07 | Stop reason: SDUPTHER

## 2022-05-14 RX ORDER — HYDROCORTISONE ACETATE PRAMOXINE HCL 2.5; 1 G/100G; G/100G
1 CREAM TOPICAL AS NEEDED
Qty: 30 G | Refills: 0 | Status: SHIPPED | OUTPATIENT
Start: 2022-05-14

## 2022-05-14 RX ORDER — CALCIUM GLUCONATE 20 MG/ML
2 INJECTION, SOLUTION INTRAVENOUS ONCE
Status: COMPLETED | OUTPATIENT
Start: 2022-05-14 | End: 2022-05-14

## 2022-05-14 RX ORDER — LABETALOL 100 MG/1
200 TABLET, FILM COATED ORAL 2 TIMES DAILY
Status: DISCONTINUED | OUTPATIENT
Start: 2022-05-14 | End: 2022-05-14 | Stop reason: HOSPADM

## 2022-05-14 RX ADMIN — DILTIAZEM HYDROCHLORIDE 180 MG: 180 CAPSULE, COATED, EXTENDED RELEASE ORAL at 17:52

## 2022-05-14 RX ADMIN — LABETALOL HYDROCHLORIDE 100 MG: 100 TABLET, FILM COATED ORAL at 09:48

## 2022-05-14 RX ADMIN — DILTIAZEM HYDROCHLORIDE 30 MG: 30 TABLET, FILM COATED ORAL at 11:49

## 2022-05-14 RX ADMIN — DOCUSATE SODIUM 100 MG: 100 CAPSULE, LIQUID FILLED ORAL at 09:48

## 2022-05-14 RX ADMIN — CALCIUM GLUCONATE 1 G: 20 INJECTION, SOLUTION INTRAVENOUS at 17:52

## 2022-05-14 RX ADMIN — PRENATAL VITAMINS-IRON FUMARATE 27 MG IRON-FOLIC ACID 0.8 MG TABLET 1 TABLET: at 09:48

## 2022-05-14 RX ADMIN — DILTIAZEM HYDROCHLORIDE 30 MG: 30 TABLET, FILM COATED ORAL at 05:07

## 2022-05-14 RX ADMIN — CALCIUM GLUCONATE 2 G: 20 INJECTION, SOLUTION INTRAVENOUS at 16:11

## 2022-05-14 NOTE — PLAN OF CARE
Patient sleeping. FOB sleeping at the bedside. Patient has been cooperative, but continues to be flat. Patient's vitals stable; will continue to monitor. Patient now has nicotine patch per patient request located on her left arm. Patient has not asked to see infant at this time. Labor and Delivery RN came to assess the patient for postpartum assessment. Patient is hoping to be discharged tomorrow.    Problem: Adult Inpatient Plan of Care  Goal: Absence of Hospital-Acquired Illness or Injury  Intervention: Identify and Manage Fall Risk  Recent Flowsheet Documentation  Taken 5/14/2022 0000 by Fatuma Cohen RN  Safety Promotion/Fall Prevention:   activity supervised   assistive device/personal items within reach   clutter free environment maintained   fall prevention program maintained   lighting adjusted   nonskid shoes/slippers when out of bed   room organization consistent   safety round/check completed  Taken 5/13/2022 2000 by Fatuma Cohen RN  Safety Promotion/Fall Prevention:   activity supervised   assistive device/personal items within reach   clutter free environment maintained   fall prevention program maintained   lighting adjusted   nonskid shoes/slippers when out of bed   room organization consistent   safety round/check completed  Intervention: Prevent Skin Injury  Recent Flowsheet Documentation  Taken 5/14/2022 0000 by Fatuma Cohen RN  Body Position: position changed independently  Skin Protection:   adhesive use limited   pulse oximeter probe site changed   transparent dressing maintained   tubing/devices free from skin contact  Taken 5/13/2022 2000 by Fatuma Cohen RN  Skin Protection:   adhesive use limited   incontinence pads utilized   pulse oximeter probe site changed   transparent dressing maintained   tubing/devices free from skin contact  Intervention: Prevent and Manage VTE (Venous Thromboembolism) Risk  Recent Flowsheet Documentation  Taken 5/14/2022 0000 by Fatuma Cohen RN  VTE  Prevention/Management:   bilateral   sequential compression devices off  Taken 5/13/2022 2000 by Fatuma Cohen RN  VTE Prevention/Management:   bilateral   sequential compression devices off  Intervention: Prevent Infection  Recent Flowsheet Documentation  Taken 5/14/2022 0000 by Fatuma Cohen RN  Infection Prevention:   environmental surveillance performed   equipment surfaces disinfected   hand hygiene promoted   personal protective equipment utilized   rest/sleep promoted   single patient room provided  Taken 5/13/2022 2000 by Fatuma Cohen RN  Infection Prevention:   environmental surveillance performed   equipment surfaces disinfected   hand hygiene promoted   personal protective equipment utilized   rest/sleep promoted   single patient room provided  Goal: Optimal Comfort and Wellbeing  Intervention: Provide Person-Centered Care  Recent Flowsheet Documentation  Taken 5/14/2022 0000 by Fatuma Cohen RN  Trust Relationship/Rapport:   care explained   emotional support provided   empathic listening provided   questions answered   reassurance provided   thoughts/feelings acknowledged  Taken 5/13/2022 2000 by Fatuma Cohen RN  Trust Relationship/Rapport:   care explained   emotional support provided   empathic listening provided   questions answered   questions encouraged   reassurance provided   thoughts/feelings acknowledged     Problem: Change in Fetal Wellbeing (Labor)  Goal: Stable Fetal Wellbeing  Intervention: Promote and Monitor Fetal Wellbeing  Recent Flowsheet Documentation  Taken 5/14/2022 0000 by Fatuma Cohen RN  Body Position: position changed independently     Problem: Infection (Labor)  Goal: Absence of Infection Signs and Symptoms  Intervention: Prevent or Manage Infection  Recent Flowsheet Documentation  Taken 5/14/2022 0000 by Fatuma Cohen RN  Infection Prevention:   environmental surveillance performed   equipment surfaces disinfected   hand hygiene promoted   personal protective equipment utilized    rest/sleep promoted   single patient room provided  Taken 5/13/2022 2000 by Fatuma Cohen, RN  Infection Prevention:   environmental surveillance performed   equipment surfaces disinfected   hand hygiene promoted   personal protective equipment utilized   rest/sleep promoted   single patient room provided     Problem: Adjustment to Role Transition (Postpartum Vaginal Delivery)  Goal: Successful Maternal Role Transition  Intervention: Support Maternal Role Transition  Recent Flowsheet Documentation  Taken 5/14/2022 0000 by Fatuma Cohen, RN  Supportive Measures:   active listening utilized   positive reinforcement provided   self-care encouraged   verbalization of feelings encouraged  Parent/Child Attachment Promotion: participation in care promoted  Taken 5/13/2022 2000 by Fatuma Cohen, RN  Supportive Measures: active listening utilized  Parent/Child Attachment Promotion:   caring behavior modeled   positive reinforcement provided   Goal Outcome Evaluation:

## 2022-05-14 NOTE — PROGRESS NOTES
Referring Provider: Cristhian Mclaughlin MD    Reason for follow-up:  Postpartum hypertension     Patient Care Team:  Provider, No Known as PCP - General    Subjective .      ROS    The patient has been without any chest discomfort ,shortness of breath, palpitations, dizziness or syncope.  Denies having any headache ,abdominal pain ,nausea, vomiting , diarrhea constipation, loss of weight or loss of appetite.  Denies having any excessive bruising ,hematuria or blood in the stool.    Review of all systems negative except as indicated    History  Past Medical History:   Diagnosis Date   • Clavicle fracture        History reviewed. No pertinent surgical history.    No family history on file.    Social History     Tobacco Use   • Smoking status: Former Smoker     Quit date: 10/16/2021     Years since quittin.5   • Smokeless tobacco: Never Used   Substance Use Topics   • Alcohol use: Not Currently   • Drug use: Not Currently        Medications Prior to Admission   Medication Sig Dispense Refill Last Dose   • prenatal vitamin (prenatal, CLASSIC, vitamin) tablet Take 1 tablet by mouth Daily.   2022 at Unknown time       Allergies  Patient has no known allergies.    Scheduled Meds:dilTIAZem, 30 mg, Oral, Q6H  docusate sodium, 100 mg, Oral, BID  labetalol, 100 mg, Oral, BID  lidocaine PF 1%, 30 mL, Infiltration, Once  nicotine, 1 patch, Transdermal, Q24H  prenatal vitamin, 1 tablet, Oral, Daily      Continuous Infusions:lactated ringers, 125 mL/hr, Last Rate: Stopped (22)  magnesium sulfate, 0.5 g/hr, Last Rate: Stopped (22)      PRN Meds:.•  benzocaine-menthol  •  bisacodyl  •  hydrALAZINE  •  HYDROcodone-acetaminophen  •  Hydrocort-Pramoxine (Perianal)  •  ibuprofen  •  magnesium hydroxide  •  Measles, Mumps & Rubella Vac  •  Morphine  •  ondansetron  •  ondansetron  •  witch hazel-glycerin    Objective     VITAL SIGNS  Vitals:    22 2340 22 0200 22 0507 22 0600   BP:  "134/86 126/77 127/96 (!) 132/101   BP Location:  Right arm  Right arm   Patient Position:  Lying  Lying   Pulse: 76 76 75 74   Resp:  11  14   Temp:  99.1 °F (37.3 °C)  98.3 °F (36.8 °C)   TempSrc:  Oral  Oral   SpO2:       Weight:       Height:           Flowsheet Rows    Flowsheet Row First Filed Value   Admission Height 157.5 cm (62\") Documented at 05/11/2022 2100   Admission Weight 55.1 kg (121 lb 7.6 oz) Documented at 05/11/2022 2100            Intake/Output Summary (Last 24 hours) at 5/14/2022 0749  Last data filed at 5/14/2022 0500  Gross per 24 hour   Intake 720 ml   Output --   Net 720 ml        TELEMETRY: Sinus rhythm    Physical Exam:  The patient is alert, oriented and in no distress.  Vital signs as noted above.  Head and neck revealed no carotid bruits or jugular venous distention.  No thyromegaly or lymphadenopathy is present  Lungs clear.  No wheezing.  Breath sounds are normal bilaterally.  Heart normal first and second heart sounds.  No murmur. No precordial rub is present.  No gallop is present.  Abdomen soft and nontender.  No organomegaly is present.  Extremities with good peripheral pulses without any pedal edema.  Skin warm and dry.  Musculoskeletal system is grossly normal  CNS grossly normal      Results Review:   I reviewed the patient's new clinical results.  Lab Results (last 24 hours)     Procedure Component Value Units Date/Time    Comprehensive Metabolic Panel [[id number]]  (Abnormal) Collected: 05/13/22 1322    Specimen: Blood Updated: 05/13/22 1400     Glucose 80 mg/dL      BUN 26 mg/dL      Creatinine 1.20 mg/dL      Sodium 132 mmol/L      Potassium 5.2 mmol/L      Comment: Slight hemolysis detected by analyzer. Results may be affected.        Chloride 106 mmol/L      CO2 20.0 mmol/L      Calcium 6.3 mg/dL      Total Protein 4.1 g/dL      Albumin 1.60 g/dL      ALT (SGPT) 12 U/L      AST (SGOT) 25 U/L      Alkaline Phosphatase 183 U/L      Total Bilirubin <0.2 mg/dL      Globulin 2.5 " gm/dL      A/G Ratio 0.6 g/dL      BUN/Creatinine Ratio 21.7     Anion Gap 6.0 mmol/L      eGFR 66.2 mL/min/1.73      Comment: National Kidney Foundation and American Society of Nephrology (ASN) Task Force recommended calculation based on the Chronic Kidney Disease Epidemiology Collaboration (CKD-EPI) equation refit without adjustment for race.       Narrative:      GFR Normal >60  Chronic Kidney Disease <60  Kidney Failure <15      BNP [[id number]]  (Abnormal) Collected: 05/13/22 1322    Specimen: Blood Updated: 05/13/22 1355     proBNP 1,640.0 pg/mL     Narrative:      Among patients with dyspnea, NT-proBNP is highly sensitive for the detection of acute congestive heart failure. In addition NT-proBNP of <300 pg/ml effectively rules out acute congestive heart failure with 99% negative predictive value.    Results may be falsely decreased if patient taking Biotin.      Rubella Antibody, IgG [[id number]] Collected: 05/11/22 1739    Specimen: Blood Updated: 05/13/22 0954     Rubella Antibodies, IgG Non-Immune          Imaging Results (Last 24 Hours)     ** No results found for the last 24 hours. **      LAB RESULTS (LAST 7 DAYS)    CBC  Results from last 7 days   Lab Units 05/12/22  0505 05/11/22  1739   WBC 10*3/mm3 22.40* 13.60*   RBC 10*6/mm3 5.16 5.49*   HEMOGLOBIN g/dL 14.8 15.4   HEMATOCRIT % 45.2 48.7*   MCV fL 87.6 88.8   PLATELETS 10*3/mm3 153 181       BMP  Results from last 7 days   Lab Units 05/13/22  1322 05/12/22  2257 05/12/22  1703 05/12/22  1208 05/12/22  0505 05/11/22  1739   SODIUM mmol/L 132* 128*  --   --  128* 135*   POTASSIUM mmol/L 5.2 5.5*  --   --  4.9 4.6   CHLORIDE mmol/L 106 102  --   --  101 106   CO2 mmol/L 20.0* 16.0*  --   --  16.0* 18.0*   BUN mg/dL 26* 25*  --   --  24* 22*   CREATININE mg/dL 1.20* 1.15*  --   --  1.31* 1.28*   GLUCOSE mg/dL 80 87  --   --  128* 78   MAGNESIUM mg/dL  --   --  7.5* 8.2* 8.4*  --        CMP   Results from last 7 days   Lab Units 05/13/22  8270  05/12/22  2257 05/12/22  0505 05/11/22  1739   SODIUM mmol/L 132* 128* 128* 135*   POTASSIUM mmol/L 5.2 5.5* 4.9 4.6   CHLORIDE mmol/L 106 102 101 106   CO2 mmol/L 20.0* 16.0* 16.0* 18.0*   BUN mg/dL 26* 25* 24* 22*   CREATININE mg/dL 1.20* 1.15* 1.31* 1.28*   GLUCOSE mg/dL 80 87 128* 78   ALBUMIN g/dL 1.60* 1.90* 1.90* 2.00*   BILIRUBIN mg/dL <0.2 <0.2 0.2 0.2   ALK PHOS U/L 183* 224* 254* 292*   AST (SGOT) U/L 25 33* 37* 29   ALT (SGPT) U/L 12 16 16 12         BNP        TROPONIN        CoAg        Creatinine Clearance  Estimated Creatinine Clearance: 70.1 mL/min (A) (by C-G formula based on SCr of 1.2 mg/dL (H)).    ABG        Radiology  US Abdomen Limited    Result Date: 5/12/2022  Large amount of relatively simple-appearing free fluid in the right upper abdomen, nonspecific. This could be further evaluated with CT as clinically indicated. Partially contracted gallbladder with circumferential wall thickening and a small amount of adjacent fluid. The wall thickening could be due to contraction at the time of imaging, though is nonspecific. No stones seen in the gallbladder. Electronically signed by:  Pierce Macias M.D.  5/12/2022 9:30 PM          EKG            I personally viewed and interpreted the patient's EKG/Telemetry data:    ECHOCARDIOGRAM:    Results for orders placed during the hospital encounter of 05/11/22    Adult Transthoracic Echo Complete W/ Cont if Necessary Per Protocol    Interpretation Summary  · Left ventricular systolic function is low normal.  · Left ventricular ejection fraction is 55%  · Left ventricular wall thickness is consistent with mild to moderate concentric hypertrophy.  · Left ventricular diastolic function was normal.  · The main pulmonary artery is mildly dilated.          STRESS MYOVIEW:    Cardiolite (Tc-99m Sestamibi) stress test    CARDIAC CATHETERIZATION:            OTHER:         Assessment & Plan     Active Problems:    Pregnant and not yet delivered      Patient is  22-year-old white female whose past medical history is significant for drug abuse, who was pregnant and was admitted with eclampsia.  Patient underwent delivery day before yesterday.  Cardiology consultation is requested for management of hypertension.     Patient was admitted with preeclampsia and was started on intravenous magnesium.  Patient underwent controlled vaginal birth.  Postpartum her blood pressure was still elevated.  She complain of mild shortness of breath.     Patient denies any chest pain or tightness or heaviness.  Patient denies any orthopnea PND.  No significant leg edema noted.  Trace leg edema present.     Patient has history of amphetamine abuse.  Patient would not feed baby.  I have discussed with OB/GYN team.       Hypertensive urgency     -Proved with initiation of medical therapy  Status post vaginal delivery  Hyponatremia     Patient is seen and examined and findings are verified.  All data is reviewed by me personally.  Assessment and plan formulated by APC was done after discussion with attending.  I spent more than 50% of time in taking care of the patient.     MDM:     1.  Hypertension:     Patient had postpartum hypertension.  I would recommend to start Cardizem and labetalol.  I chose labetalol because patient has history of amphetamine abuse.  Selective beta-blocker would be unwarranted.  Labetalol as both beta-blocker and alpha-blocker effects.     2.  Electrolyte abnormalities:      ]]]]]]]]]]]]]]]]]]]]  5/14/2022   Chart was reviewed especially primary cardiologist report.    Postpartum hypertension  Blood pressure is still elevated  Patient is on Cardizem and labetalol.  Change Cardizem to long-acting CD 1 80 mg a day  Increase labetalol to 200 mg twice a day.    Renal dysfunction  BUN/creatinine-26/1.2-5/14/2022    Hypokalemia  5.5-5/13/2022  5.0- 5/14/2022    Patient is not breast-feeding.    Patient is anxious to go home.    Okay with discharge plans today.    Follow-up with  primary cardiologist Dr. Montgomery in 2 weeks.    Further plan will depend on patient's progress.    Benny Padilla MD  05/14/22  07:49 EDT

## 2022-05-14 NOTE — CONSULTS
[facility] Medicine Services      Patient Name: Shima Acuña  : 2000  MRN: [id number]  Primary Care Physician:  Provider, No Known  Date of admission: 2022      Subjective      Chief Complaint: headache    History of Present Illness: Shima Acuña is a 22 y.o. female  currently at 37w5d by self-reported EDC, who presents with headache and onset of labor  The patient had no prenatal care. Previous admission to labor and delivery was positive for amphetamines and opioids on a urine drug screen  She presents in a hypertensive crisis with systolic and diastolics in the severe range, 3+ proteinuria, and a positive drug screen for amphetamines  Additional admission labs reveal a degree of renal dysfunction with elevated creatinine and BUN patient was admitted to the GYN service              Delivery narrative: Presented to labor and delivery with complaints of headache and onset of labor.  Was noted to be 4 cm dilated on admission.  Blood pressure was in the severe range as high as 170/120.  She had 3+ proteinuria brisk reflexes.  Liver enzymes were normal except for an elevated alkaline phosphatase.  Platelet counts were normal.  She did have an elevated creatinine at 1.28.  Urine drug screen was positive for methamphetamines.  Previous the drug screen was positive for methamphetamines and opioids     She did not have spontaneous rupture of membranes.  Received a single dose of morphine for the active phase of labor.  Normal progression through the active phase.     Pushed for approximately 30 minutes with a controlled delivery over an intact perineum.  The infant was bulb suction with delivery and handed to waiting respiratory therapy.  Infant had good color and tone and responded to stimulation     Placenta delivered spontaneously intact.  The uterus contracted well and lochia became scant.  There is a first-degree introital tear which did not require repair     We were asked  to see patient for medical management 5/14/2022.  Patient was felt to be stable for discharge by Cardiology and GYN.      Review of Systems   Constitutional: Negative.   HENT: Negative.    Eyes: Negative.    Cardiovascular: Negative.    Respiratory: Negative.    Endocrine: Negative.    Hematologic/Lymphatic: Negative.    Skin: Negative.    Musculoskeletal: Negative.    Gastrointestinal: Negative.    Genitourinary: Negative.    Neurological: Negative.    Psychiatric/Behavioral: Negative.    Allergic/Immunologic: Negative.    All other systems reviewed and are negative.       Personal History     Past Medical History:   Diagnosis Date   • Clavicle fracture        History reviewed. No pertinent surgical history.    Family History: Otherwise pertinent FHx was reviewed and not pertinent to current issue. Mother good health     Social History:  reports that she quit smoking about 6 months ago. She has never used smokeless tobacco. She reports previous alcohol use. She reports previous drug use.    Home Medications:  Prior to Admission Medications     Prescriptions Last Dose Informant Patient Reported? Taking?    prenatal vitamin (prenatal, CLASSIC, vitamin) tablet 5/11/2022 Self Yes Yes    Take 1 tablet by mouth Daily.            Allergies:  No Known Allergies    Objective      Vitals:   Temp:  [98.3 °F (36.8 °C)-99.1 °F (37.3 °C)] 98.4 °F (36.9 °C)  Heart Rate:  [62-82] 62  Resp:  [10-14] 13  BP: (121-157)/() 157/99    Physical Exam  Vitals and nursing note reviewed.   Constitutional:       General: She is not in acute distress.     Appearance: Normal appearance. She is well-developed. She is not ill-appearing, toxic-appearing or diaphoretic.   HENT:      Head: Normocephalic and atraumatic.      Nose: Nose normal. No congestion or rhinorrhea.      Mouth/Throat:      Mouth: Mucous membranes are moist.      Pharynx: No oropharyngeal exudate.   Eyes:      General: No scleral icterus.        Right eye: No discharge.          Left eye: No discharge.      Extraocular Movements: Extraocular movements intact.      Conjunctiva/sclera: Conjunctivae normal.      Pupils: Pupils are equal, round, and reactive to light.   Neck:      Thyroid: No thyromegaly.      Vascular: No carotid bruit or JVD.      Trachea: No tracheal deviation.   Cardiovascular:      Rate and Rhythm: Normal rate and regular rhythm.      Pulses: Normal pulses.      Heart sounds: Normal heart sounds. No murmur heard.    No friction rub. No gallop.   Pulmonary:      Effort: Pulmonary effort is normal. No respiratory distress.      Breath sounds: Normal breath sounds. No wheezing or rales.   Abdominal:      General: Bowel sounds are normal. There is no distension.      Palpations: Abdomen is soft.      Tenderness: There is no abdominal tenderness. There is no guarding.   Musculoskeletal:         General: No swelling, tenderness, deformity or signs of injury. Normal range of motion.      Cervical back: Normal range of motion and neck supple. No rigidity. No muscular tenderness.      Right lower leg: No edema.      Left lower leg: No edema.   Lymphadenopathy:      Cervical: No cervical adenopathy.   Skin:     General: Skin is warm and dry.      Coloration: Skin is not jaundiced or pale.      Findings: No bruising, erythema or rash.   Neurological:      General: No focal deficit present.      Mental Status: She is alert and oriented to person, place, and time. Mental status is at baseline.      Cranial Nerves: No cranial nerve deficit.      Sensory: No sensory deficit.      Motor: No weakness or abnormal muscle tone.      Coordination: Coordination normal.   Psychiatric:         Mood and Affect: Mood normal.         Behavior: Behavior normal.         Thought Content: Thought content normal.         Judgment: Judgment normal.          Result Review    Result Review:  I have personally reviewed the results from the time of this admission to 5/14/2022 16:21 EDT and agree with  these findings:  []  Laboratory  []  Microbiology  []  Radiology  []  EKG/Telemetry   []  Cardiology/Vascular   []  Pathology  []  Old records  []  Other:  Most notable findings include:   CMP:      Lab 05/13/22  1322 05/12/22  2257 05/12/22  1703 05/12/22  1208 05/12/22  0505 05/11/22  1739   SODIUM 132* 128*  --   --  128* 135*   POTASSIUM 5.2 5.5*  --   --  4.9 4.6   CHLORIDE 106 102  --   --  101 106   CO2 20.0* 16.0*  --   --  16.0* 18.0*   ANION GAP 6.0 10.0  --   --  11.0 11.0   BUN 26* 25*  --   --  24* 22*   CREATININE 1.20* 1.15*  --   --  1.31* 1.28*   EGFR 66.2 69.7  --   --  59.6* 61.3   GLUCOSE 80 87  --   --  128* 78   CALCIUM 6.3* 6.2*  --   --  7.2* 7.8*   MAGNESIUM  --   --  7.5* 8.2* 8.4*  --    TOTAL PROTEIN 4.1* 4.4*  --   --  4.9* 5.6*   ALBUMIN 1.60* 1.90*  --   --  1.90* 2.00*   GLOBULIN 2.5 2.5  --   --  3.0 3.6   ALT (SGPT) 12 16  --   --  16 12   AST (SGOT) 25 33*  --   --  37* 29   BILIRUBIN <0.2 <0.2  --   --  0.2 0.2   ALK PHOS 183* 224*  --   --  254* 292*     CBC:      Lab 05/12/22  0505 05/11/22  1739   WBC 22.40* 13.60*   HEMOGLOBIN 14.8 15.4   HEMATOCRIT 45.2 48.7*   PLATELETS 153 181   NEUTROS ABS 18.80* 9.20*   LYMPHS ABS 2.60 3.20*   MONOS ABS 0.90 1.00*   EOS ABS 0.00 0.10   MCV 87.6 88.8       Assessment & Plan        Active Hospital Problems:  Active Hospital Problems    Diagnosis    • **Pregnant and not yet delivered      Hypertensive urgency     -continue present care    Status post vaginal delivery-Day 3  post-partum from a  Spontaneous Vaginal Delivery      Hyponatremia 128-135  -monitor bmp    Leukocytosis likely reactive  -monitor CBC, UA negative, check chest x-ray.  -check procal    Acute renal insufficiency-monitor renal function avoid hypotension avoid nephrotoxin  -iv fluids    Hypocalcemia replace and monitor  -check pth      DVT prophylaxis:  Mechanical DVT prophylaxis orders are present.    CODE STATUS:    Code Status (Patient has no pulse and is not breathing):  CPR (Attempt to Resuscitate)  Medical Interventions (Patient has pulse or is breathing): Full    Admission Status:  I believe this patient meets impatient status.    I discussed the patient's findings and my recommendations with patient.    This patient has been examined wearing appropriate Personal Protective Equipment and discussed with rn. 05/14/22      Signature: Electronically signed by Amador Armas MD, 05/14/22, 4:26 PM EDT.

## 2022-05-14 NOTE — PROGRESS NOTES
"[facility]  Postpartum Note    Subjective   Postpartum Day 2:  Spontaneous Vaginal Delivery    Patient without complaints. Her pain is well controlled with nonsteroidal anti-inflammatory drugs. She is ambulating well.  Patient describes her bleeding as thin lochia.    Breastfeeding: declines.    Objective     Vitals:  Vitals:    05/13/22 0700 05/13/22 1003 05/13/22 1418 05/13/22 1436   BP: 117/71 99/66 130/72 130/72   BP Location:  Right arm Right arm    Patient Position:  Lying Lying    Pulse: 105 78 85    Resp:  13 14    Temp:  98 °F (36.7 °C)     TempSrc:  Oral     SpO2: 93% 94%     Weight:    59.9 kg (132 lb)   Height:    157.5 cm (62\")       Physical Exam:  General:  no acute distress.  Abdomen: Soft, appropriately tender, ND   Fundus firm, below the umbilicus  Extremities: normal,  trace edema.     Labs:  Lab Results (last 72 hours)     Procedure Component Value Units Date/Time    Comprehensive Metabolic Panel [[id number]]  (Abnormal) Collected: 05/13/22 1322    Specimen: Blood Updated: 05/13/22 1400     Glucose 80 mg/dL      BUN 26 mg/dL      Creatinine 1.20 mg/dL      Sodium 132 mmol/L      Potassium 5.2 mmol/L      Comment: Slight hemolysis detected by analyzer. Results may be affected.        Chloride 106 mmol/L      CO2 20.0 mmol/L      Calcium 6.3 mg/dL      Total Protein 4.1 g/dL      Albumin 1.60 g/dL      ALT (SGPT) 12 U/L      AST (SGOT) 25 U/L      Alkaline Phosphatase 183 U/L      Total Bilirubin <0.2 mg/dL      Globulin 2.5 gm/dL      A/G Ratio 0.6 g/dL      BUN/Creatinine Ratio 21.7     Anion Gap 6.0 mmol/L      eGFR 66.2 mL/min/1.73      Comment: National Kidney Foundation and American Society of Nephrology (ASN) Task Force recommended calculation based on the Chronic Kidney Disease Epidemiology Collaboration (CKD-EPI) equation refit without adjustment for race.       Narrative:      GFR Normal >60  Chronic Kidney Disease <60  Kidney Failure <15      BNP [[id number]]  (Abnormal) Collected: 05/13/22 " 1322    Specimen: Blood Updated: 05/13/22 1355     proBNP 1,640.0 pg/mL     Narrative:      Among patients with dyspnea, NT-proBNP is highly sensitive for the detection of acute congestive heart failure. In addition NT-proBNP of <300 pg/ml effectively rules out acute congestive heart failure with 99% negative predictive value.    Results may be falsely decreased if patient taking Biotin.      Rubella Antibody, IgG [[id number]] Collected: 05/11/22 1739    Specimen: Blood Updated: 05/13/22 0954     Rubella Antibodies, IgG Non-Immune    Comprehensive Metabolic Panel [[id number]]  (Abnormal) Collected: 05/12/22 2257    Specimen: Blood Updated: 05/12/22 2338     Glucose 87 mg/dL      BUN 25 mg/dL      Creatinine 1.15 mg/dL      Sodium 128 mmol/L      Potassium 5.5 mmol/L      Comment: Slight hemolysis detected by analyzer. Results may be affected.        Chloride 102 mmol/L      CO2 16.0 mmol/L      Calcium 6.2 mg/dL      Total Protein 4.4 g/dL      Albumin 1.90 g/dL      ALT (SGPT) 16 U/L      AST (SGOT) 33 U/L      Comment: Slight hemolysis detected by analyzer. Results may be affected.        Alkaline Phosphatase 224 U/L      Total Bilirubin <0.2 mg/dL      Globulin 2.5 gm/dL      A/G Ratio 0.8 g/dL      BUN/Creatinine Ratio 21.7     Anion Gap 10.0 mmol/L      eGFR 69.7 mL/min/1.73      Comment: National Kidney Foundation and American Society of Nephrology (ASN) Task Force recommended calculation based on the Chronic Kidney Disease Epidemiology Collaboration (CKD-EPI) equation refit without adjustment for race.       Narrative:      GFR Normal >60  Chronic Kidney Disease <60  Kidney Failure <15      Urine Culture - Urine, Urine, Clean Catch [[id number]]  (Normal) Collected: 05/11/22 1924    Specimen: Urine, Clean Catch Updated: 05/12/22 2020     Urine Culture No growth    Magnesium [[id number]]  (Abnormal) Collected: 05/12/22 1703    Specimen: Blood Updated: 05/12/22 1743     Magnesium 7.5 mg/dL     RPR [[id number]]   (Normal) Collected: 05/11/22 1739    Specimen: Blood Updated: 05/12/22 1517     RPR Non-Reactive    Magnesium [[id number]]  (Abnormal) Collected: 05/12/22 1208    Specimen: Blood Updated: 05/12/22 1307     Magnesium 8.2 mg/dL     Magnesium [[id number]]  (Abnormal) Collected: 05/12/22 0505    Specimen: Blood Updated: 05/12/22 0557     Magnesium 8.4 mg/dL     Comprehensive Metabolic Panel [[id number]]  (Abnormal) Collected: 05/12/22 0505    Specimen: Blood Updated: 05/12/22 0543     Glucose 128 mg/dL      BUN 24 mg/dL      Creatinine 1.31 mg/dL      Sodium 128 mmol/L      Potassium 4.9 mmol/L      Chloride 101 mmol/L      CO2 16.0 mmol/L      Calcium 7.2 mg/dL      Total Protein 4.9 g/dL      Albumin 1.90 g/dL      ALT (SGPT) 16 U/L      AST (SGOT) 37 U/L      Alkaline Phosphatase 254 U/L      Total Bilirubin 0.2 mg/dL      Globulin 3.0 gm/dL      A/G Ratio 0.6 g/dL      BUN/Creatinine Ratio 18.3     Anion Gap 11.0 mmol/L      eGFR 59.6 mL/min/1.73      Comment: National Kidney Foundation and American Society of Nephrology (ASN) Task Force recommended calculation based on the Chronic Kidney Disease Epidemiology Collaboration (CKD-EPI) equation refit without adjustment for race.       Narrative:      GFR Normal >60  Chronic Kidney Disease <60  Kidney Failure <15      CBC & Differential [[id number]]  (Abnormal) Collected: 05/12/22 0505    Specimen: Blood Updated: 05/12/22 0526    Narrative:      The following orders were created for panel order CBC & Differential.  Procedure                               Abnormality         Status                     ---------                               -----------         ------                     CBC Auto Differential[[id number]]        Abnormal            Final result                 Please view results for these tests on the individual orders.    CBC Auto Differential [[id number]]  (Abnormal) Collected: 05/12/22 0505    Specimen: Blood Updated: 05/12/22 0526     WBC 22.40 10*3/mm3       RBC 5.16 10*6/mm3      Hemoglobin 14.8 g/dL      Hematocrit 45.2 %      MCV 87.6 fL      MCH 28.7 pg      MCHC 32.8 g/dL      RDW 14.3 %      RDW-SD 43.8 fl      MPV 9.7 fL      Platelets 153 10*3/mm3      Neutrophil % 83.6 %      Lymphocyte % 11.7 %      Monocyte % 4.1 %      Eosinophil % 0.0 %      Basophil % 0.6 %      Neutrophils, Absolute 18.80 10*3/mm3      Lymphocytes, Absolute 2.60 10*3/mm3      Monocytes, Absolute 0.90 10*3/mm3      Eosinophils, Absolute 0.00 10*3/mm3      Basophils, Absolute 0.10 10*3/mm3      nRBC 0.1 /100 WBC     Urinalysis, Microscopic Only - Urine, Clean Catch [[id number]]  (Abnormal) Collected: 05/11/22 1924    Specimen: Urine, Clean Catch Updated: 05/11/22 2018     RBC, UA 0-2 /HPF      WBC, UA 6-12 /HPF      Bacteria, UA Trace /HPF      Squamous Epithelial Cells, UA 3-6 /HPF      Hyaline Casts, UA 7-12 /LPF      Waxy Casts 0-2 /LPF      Granular Casts, UA 21-30 /LPF      Amorphous Crystals, UA Small/1+ /HPF      Methodology Manual Light Microscopy    Urine Drug Screen - Urine, Clean Catch [[id number]]  (Abnormal) Collected: 05/11/22 1739    Specimen: Urine, Clean Catch Updated: 05/11/22 2016     Amphet/Methamphet, Screen Positive     Barbiturates Screen, Urine Negative     Benzodiazepine Screen, Urine Negative     Cocaine Screen, Urine Negative     Opiate Screen Negative     THC, Screen, Urine Negative     Methadone Screen, Urine Negative     Oxycodone Screen, Urine Negative    Narrative:      Negative Thresholds Per Drugs Screened:    Amphetamines                 500 ng/ml  Barbiturates                 200 ng/ml  Benzodiazepines              100 ng/ml  Cocaine                      300 ng/ml  Methadone                    300 ng/ml  Opiates                      300 ng/ml  Oxycodone                    100 ng/ml  THC                           50 ng/ml    The Normal Value for all drugs tested is negative. This report includes final unconfirmed screening results to be used for medical  treatment purposes only. Unconfirmed results must not be used for non-medical purposes such as employment or legal testing. Clinical consideration should be applied to any drug of abuse test, particularly when unconfirmed results are used.          All urine drugs of abuse requests without chain of custody are for medical screening purposes only.  False positives are possible.      Urinalysis With Culture If Indicated - Urine, Clean Catch [[id number]]  (Abnormal) Collected: 05/11/22 1924    Specimen: Urine, Clean Catch Updated: 05/11/22 2005     Color, UA Dark Yellow     Appearance, UA Cloudy     pH, UA <=5.0     Specific Gravity, UA 1.025     Glucose, UA Negative     Ketones, UA Trace     Bilirubin, UA Negative     Blood, UA Small (1+)     Protein, UA >=300 mg/dL (3+)     Leuk Esterase, UA Trace     Nitrite, UA Negative     Urobilinogen, UA 1.0 E.U./dL    Rapid Assay For ROM - Amniotic Fluid, Amniotic Sac [[id number]]  (Abnormal) Collected: 05/11/22 1910    Specimen: Amniotic Fluid from Amniotic Sac Updated: 05/11/22 1948     Rupture of Membranes Positive    Hepatitis C Antibody [[id number]]  (Normal) Collected: 05/11/22 1739    Specimen: Blood Updated: 05/11/22 1835     Hepatitis C Ab Non-Reactive    Narrative:      Results may be falsely decreased if patient taking Biotin.      HIV-1 & HIV-2 Antibodies [[id number]]  (Normal) Collected: 05/11/22 1739    Specimen: Blood Updated: 05/11/22 1835    Narrative:      The following orders were created for panel order HIV-1 & HIV-2 Antibodies.  Procedure                               Abnormality         Status                     ---------                               -----------         ------                     HIV-1 / O / 2 Ag / Antib...[[id number]]  Normal              Final result                 Please view results for these tests on the individual orders.    HIV-1 / O / 2 Ag / Antibody 4th Generation [[id number]]  (Normal) Collected: 05/11/22 1739    Specimen: Blood  Updated: 05/11/22 1835     HIV-1/ HIV-2 Non-Reactive     Comment: A non-reactive test result does not preclude the possibility of exposure to HIV or infection with HIV. An antibody response to recent exposure may take several months to reach detectable levels.       Narrative:      The HIV antibody/antigen combo assay is a qualitative assay for HIV that includes the p24 antigen as well as antibodies to HIV types 1 and 2. This test is intended to be used as a screening assay in the diagnosis of HIV infection in patients over the age of 2.  Results may be falsely decreased if patient taking Biotin.      COVID PRE-OP / PRE-PROCEDURE SCREENING ORDER (NO ISOLATION) - Swab, Nasopharynx [[id number]]  (Normal) Collected: 05/11/22 1730    Specimen: Swab from Nasopharynx Updated: 05/11/22 1832    Narrative:      The following orders were created for panel order COVID PRE-OP / PRE-PROCEDURE SCREENING ORDER (NO ISOLATION) - Swab, Nasopharynx.  Procedure                               Abnormality         Status                     ---------                               -----------         ------                     COVID-19,CEPHEID/GREG,CO...[[id number]]  Normal              Final result                 Please view results for these tests on the individual orders.    COVID-19,CEPHEID/GREG,COR/DALE/PAD/FESTUS IN-HOUSE(OR EMERGENT/ADD-ON),NP SWAB IN TRANSPORT MEDIA 3-4 HR TAT, RT-PCR - Swab, Nasopharynx [[id number]]  (Normal) Collected: 05/11/22 1730    Specimen: Swab from Nasopharynx Updated: 05/11/22 1832     COVID19 Not Detected    Narrative:      Fact sheet for providers: https://www.fda.gov/media/441781/download     Fact sheet for patients: https://www.fda.gov/media/146185/download  Fact sheet for providers: https://www.fda.gov/media/609673/download     Fact sheet for patients: https://www.fda.gov/media/837273/download    Hepatitis B Surface Antigen [[id number]]  (Normal) Collected: 05/11/22 1739    Specimen: Blood Updated: 05/11/22  1825     Hepatitis B Surface Ag Non-Reactive    Comprehensive Metabolic Panel [[id number]]  (Abnormal) Collected: 05/11/22 1739    Specimen: Blood Updated: 05/11/22 1815     Glucose 78 mg/dL      BUN 22 mg/dL      Creatinine 1.28 mg/dL      Sodium 135 mmol/L      Potassium 4.6 mmol/L      Chloride 106 mmol/L      CO2 18.0 mmol/L      Calcium 7.8 mg/dL      Total Protein 5.6 g/dL      Albumin 2.00 g/dL      ALT (SGPT) 12 U/L      AST (SGOT) 29 U/L      Alkaline Phosphatase 292 U/L      Total Bilirubin 0.2 mg/dL      Globulin 3.6 gm/dL      A/G Ratio 0.6 g/dL      BUN/Creatinine Ratio 17.2     Anion Gap 11.0 mmol/L      eGFR 61.3 mL/min/1.73      Comment: National Kidney Foundation and American Society of Nephrology (ASN) Task Force recommended calculation based on the Chronic Kidney Disease Epidemiology Collaboration (CKD-EPI) equation refit without adjustment for race.       Narrative:      GFR Normal >60  Chronic Kidney Disease <60  Kidney Failure <15      CBC & Differential [[id number]]  (Abnormal) Collected: 05/11/22 1739    Specimen: Blood Updated: 05/11/22 1757    Narrative:      The following orders were created for panel order CBC & Differential.  Procedure                               Abnormality         Status                     ---------                               -----------         ------                     CBC Auto Differential[[id number]]        Abnormal            Final result                 Please view results for these tests on the individual orders.    CBC Auto Differential [[id number]]  (Abnormal) Collected: 05/11/22 1739    Specimen: Blood Updated: 05/11/22 1757     WBC 13.60 10*3/mm3      RBC 5.49 10*6/mm3      Hemoglobin 15.4 g/dL      Hematocrit 48.7 %      MCV 88.8 fL      MCH 28.1 pg      MCHC 31.6 g/dL      RDW 14.4 %      RDW-SD 43.8 fl      MPV 10.1 fL      Platelets 181 10*3/mm3      Neutrophil % 67.6 %      Lymphocyte % 23.6 %      Monocyte % 7.3 %      Eosinophil % 0.8 %       Basophil % 0.7 %      Neutrophils, Absolute 9.20 10*3/mm3      Lymphocytes, Absolute 3.20 10*3/mm3      Monocytes, Absolute 1.00 10*3/mm3      Eosinophils, Absolute 0.10 10*3/mm3      Basophils, Absolute 0.10 10*3/mm3      nRBC 0.2 /100 WBC            Feeding method: Breastfeeding Status: No     Blood Type: RH Positive        Assessment & Plan       Shima Acuña is Day 2  post-partum from a  Spontaneous Vaginal Delivery    Preeclampsia, s/p magnesium for seizure prophylaxis  HTN urgency being evaluated by cardiology and hospitalist  Elevated creatinine    Plan:  Continue current care.  Evaluation by hospitalist pending      Keely Yeung MD  5/13/2022  20:21 EDT

## 2022-05-14 NOTE — PROGRESS NOTES
[facility]  Postpartum Note    Subjective   Postpartum Day 3:  Spontaneous Vaginal Delivery    Patient without complaints. Her pain is well controlled with nonsteroidal anti-inflammatory drugs and Tylenol. She is ambulating and voiding well.  Bleeding is appropriate for pp period.  No headaches.      Objective     Vitals:  Vitals:    05/14/22 0200 05/14/22 0507 05/14/22 0600 05/14/22 0958   BP: 126/77 127/96 (!) 132/101 157/99   BP Location: Right arm  Right arm Right arm   Patient Position: Lying  Lying Lying   Pulse: 76 75 74 62   Resp: 11 14 13   Temp: 99.1 °F (37.3 °C)  98.3 °F (36.8 °C) 98.4 °F (36.9 °C)   TempSrc: Oral  Oral Oral   SpO2:       Weight:       Height:           Physical Exam:  General:  no acute distress.  Abdomen: Fundus firm below umbilicus, nontender.   Extremities: moves all extremities well, no cyanosis or erythema, No edema      Labs:  Results from last 7 days   Lab Units 05/12/22  0505 05/11/22  1739   WBC 10*3/mm3 22.40* 13.60*   HEMOGLOBIN g/dL 14.8 15.4   HEMATOCRIT % 45.2 48.7*   PLATELETS 10*3/mm3 153 181     Results from last 7 days   Lab Units 05/13/22  1322   GLUCOSE mg/dL 80          Feeding method: Breastfeeding Status: No     Blood Type: RH Positive        Assessment & Plan     Active Problems:    Pregnant and not yet delivered      Shima Acuña is Day 3  post-partum from a  Spontaneous Vaginal Delivery      Plan:  Pt transferred to [facility] on PPD # 1 d/t severe range Bps.  Currently Bps are much improved and fairly well controlled on current regimen, appreciate cardiology input, labetalol, cardizem dosing increased today.  Pt is stable for d/c from a OB standpoint and states she is very ready to go home.  Postpartum instructions were all reviewed.   Pt to f/u in 1 wk in our office for a BP check, and with cardiology in 2 wks.    Creatinine is improved from arrival, but still mildly elevated, may need renal follow up as outpt if elevated Cr persists, recc repeat labs in 1 wk.        Divina Powell MD  5/14/2022  14:31 EDT

## 2022-05-14 NOTE — PLAN OF CARE
Goal Outcome Evaluation:           Problem: Adult Inpatient Plan of Care  Goal: Absence of Hospital-Acquired Illness or Injury  Intervention: Identify and Manage Fall Risk  Recent Flowsheet Documentation  Taken 5/14/2022 1400 by Odilia Marquez, RN  Safety Promotion/Fall Prevention:   safety round/check completed   room organization consistent   nonskid shoes/slippers when out of bed   fall prevention program maintained   assistive device/personal items within reach   clutter free environment maintained   activity supervised  Taken 5/14/2022 1200 by Odilia Marquez, RN  Safety Promotion/Fall Prevention:   safety round/check completed   room organization consistent   nonskid shoes/slippers when out of bed   fall prevention program maintained   clutter free environment maintained   assistive device/personal items within reach   activity supervised  Taken 5/14/2022 1000 by Odilia Marquez, RN  Safety Promotion/Fall Prevention:   room organization consistent   safety round/check completed   nonskid shoes/slippers when out of bed   fall prevention program maintained  Taken 5/14/2022 0800 by Odilia Marquez, RN  Safety Promotion/Fall Prevention:   safety round/check completed   room organization consistent   nonskid shoes/slippers when out of bed   fall prevention program maintained   clutter free environment maintained   assistive device/personal items within reach   activity supervised

## 2022-05-16 LAB — QT INTERVAL: 414 MS

## 2022-05-16 NOTE — DISCHARGE SUMMARY
[facility]  Delivery Discharge Summary    Primary OB Clinician: No admitting provider for patient encounter.    Admission Diagnosis:  Principal Problem:    Pregnant and not yet delivered      Discharge Diagnosis:  SAME  Severe preeclampsia with       Hypertension       Renal impairment.        substance abuse.  Positive urine drug screen for methamphetamines       no prenatal care.       small for gestational age infant    Gestational Age: 37w5d    Date of Delivery: 5/11/2022     Delivered By:  Cristhian Mclaughlin     Delivery Type: Vaginal, Spontaneous           Intrapartum Course: Uncomplicated delivery.     Postpartum Course:  Uncomplicated pp course.     Physical Exam:    Vitals:   Vitals:    05/14/22 0200 05/14/22 0507 05/14/22 0600 05/14/22 0958   BP: 126/77 127/96 (!) 132/101 157/99   BP Location: Right arm  Right arm Right arm   Patient Position: Lying  Lying Lying   Pulse: 76 75 74 62   Resp: 11 14 13   Temp: 99.1 °F (37.3 °C)  98.3 °F (36.8 °C) 98.4 °F (36.9 °C)   TempSrc: Oral  Oral Oral   SpO2:       Weight:       Height:         No data recorded.      General Appearance:    Alert, cooperative, in no acute distress   Abdomen:     Soft non-tender, non-distended, no guarding, no rebound         tenderness.   Extremities:   Moves all extremities well, no edema, no cyanosis, no              Redness.   Incision:    Fundus:   Firm, below umbilicus     Feeding method: Breastfeeding Status: No  Baby:   Blood Type: RH Positive      Plan:  Discharge to home.    Follow-up appointment  In 6 weeks

## 2022-05-16 NOTE — CASE MANAGEMENT/SOCIAL WORK
Case Management Discharge Note      Final Note: DC Home    Provided Post Acute Provider List?: N/A  Provided Post Acute Provider Quality & Resource List?: N/A            Transportation Services  Private:  (boyfriend)    Final Discharge Disposition Code: 01 - home or self-care     Serena Miner RN, MSN  Care Manager  [phone]

## 2022-06-07 ENCOUNTER — OFFICE VISIT (OUTPATIENT)
Dept: CARDIOLOGY | Facility: CLINIC | Age: 22
End: 2022-06-07

## 2022-06-07 VITALS
HEART RATE: 125 BPM | BODY MASS INDEX: 16.93 KG/M2 | SYSTOLIC BLOOD PRESSURE: 125 MMHG | OXYGEN SATURATION: 98 % | DIASTOLIC BLOOD PRESSURE: 85 MMHG | WEIGHT: 92 LBS | HEIGHT: 62 IN

## 2022-06-07 DIAGNOSIS — R00.0 SINUS TACHYCARDIA: Primary | ICD-10-CM

## 2022-06-07 DIAGNOSIS — I10 ESSENTIAL HYPERTENSION: ICD-10-CM

## 2022-06-07 DIAGNOSIS — R00.0 SINUS TACHYCARDIA: ICD-10-CM

## 2022-06-07 PROBLEM — IMO0002 MALUNION OF FRACTURE: Status: ACTIVE | Noted: 2022-06-07

## 2022-06-07 PROBLEM — S42.023A FRACTURE OF SHAFT OF CLAVICLE, CLOSED: Status: ACTIVE | Noted: 2022-06-07

## 2022-06-07 PROCEDURE — 93000 ELECTROCARDIOGRAM COMPLETE: CPT | Performed by: NURSE PRACTITIONER

## 2022-06-07 PROCEDURE — 99213 OFFICE O/P EST LOW 20 MIN: CPT | Performed by: NURSE PRACTITIONER

## 2022-06-07 RX ORDER — LABETALOL 200 MG/1
200 TABLET, FILM COATED ORAL 2 TIMES DAILY
Qty: 60 TABLET | Refills: 3 | Status: SHIPPED | OUTPATIENT
Start: 2022-06-07 | End: 2022-06-07 | Stop reason: SDUPTHER

## 2022-06-07 RX ORDER — DILTIAZEM HYDROCHLORIDE 180 MG/1
180 CAPSULE, COATED, EXTENDED RELEASE ORAL
Qty: 30 CAPSULE | Refills: 3 | Status: SHIPPED | OUTPATIENT
Start: 2022-06-07 | End: 2022-06-07 | Stop reason: SDUPTHER

## 2022-06-07 NOTE — PROGRESS NOTES
Cardiology Office Follow Up Visit      Primary Care Provider:  Provider, No Known    Reason for f/u:     Preeclampsia status postdelivery  Hypertension  Sinus tachycardia      Subjective     CC:    Denies chest pain or dyspnea    History of Present Illness       Shima Acuña is a 22 y.o. female.  Patient is here today for hospital follow-up.    She was recently admitted at [facility] and urgent cardiology consult requested on May 12, 2022 for preeclampsia and elevated blood pressure.    The patient was admitted with preeclampsia and started on IV magnesium.  She underwent vaginal control birth postpartum her blood pressure remained elevated and she had mild shortness of breath.    Patient has a prior history of amphetamine abuse but denies any current drug use.    Echocardiogram completed in the hospital showed her ejection fraction to be 55%.  There is mild to moderate concentric LVH.    Today patient's blood pressure is 125/85.  Heart rate is 125.  O2 saturation is 98%.  Her weight today is 92 pounds.  During her hospitalization her last weight was 132.    Patient denies any current or recent chest pain, dyspnea, PND, orthopnea, palpitations, near syncope, lower extremity edema or feelings of her heart racing.  She continues to have some lochia drainage.  But reports it is light.    She denies any alcohol or social drug use.  She does smoke occasional cigarettes.          ASSESSMENT/PLAN:      Diagnoses and all orders for this visit:    1. Sinus tachycardia (Primary)  Comments:  Heart rate currently 125 in sinus tachycardia  Orders:  -     CBC & Differential; Future  -     Cancel: Basic Metabolic Panel; Future  -     dilTIAZem CD (CARDIZEM CD) 180 MG 24 hr capsule; Take 1 capsule by mouth Daily.  Dispense: 30 capsule; Refill: 3  -     labetalol (NORMODYNE) 200 MG tablet; Take 1 tablet by mouth 2 (Two) Times a Day.  Dispense: 60 tablet; Refill: 3    2. Essential hypertension  Comments:  Blood pressure  HISTORY OF PRESENT ILLNESS  Farhan Brown is a 49 y.o. male. HPI   Last here 12/12/16. Pt is here to f/u on chronic conditions  Pt presents with his partner who provides some of his hx     BP today is 121/80  BP at home running around 120s/80s  Pt had normal BP in ED at [address] losartan 50mg and coreg 6.25mg BID    Pt wonders about appropriate allergy medications d/t HTN  Advised zyrtec and to avoid anything with D in it and to take qhs d/t possible sedation    BS at home running around 90s, low 100s, under 120 in the AM  Denies any lows under 70  Continues metformin 500mg BID   He also takes ASA 81mg daily    Pt was in ED for syncopal event, 2/06/17  Reviewed head CT: negative  Reviewed chest CT: negative for PE  Reviewed EKG: nsr   Pt had been sitting at his computer working and got very dizzy   He passed out and woke up on the floor about one hour per pt's partner but remains unclear  His partner notes this was about one hour from times between messages   Pt's BS that day was good at 104 and had been compliant with cpap, his BP was lower and his pulse was elevated at that time   Of note, Dr. Joseph Jaeger had increased his testosterone recently and this was the day after his second dose/injection- this was decreased after his syncopal event   This was ultimately thought related to his increased testosterone dose  This was also attributed to panic attack type sx from increased stress with work   Denies any further syncopal events since this   Advised f/u with his cardiologist, Dr. Michelle Tejada   Discussed some imaging to evaluate this further     Continues prilosec 20mg daily for reflux, works well, no breakthrough     Pt follows with Dr. Joseph Jaeger (endo)   Reviewed notes 1/19/17: BP good, on metformin, on effexor, taking pravachol for cholesterol.  Increase testosterone to 1.75 with shots  His testosterone dose has since been decreased d/t syncopal event     Reviewed last labs 12/16  Repeat labs today    Pt follows with Dr. Miriam Shepherd (cardio) annually in June  Advised him to f/u with his cardiologist at this time    Continues pravachol 20mg daily for cholesterol      Wt is down 3 lbs since last visit   His wt has been improving each visit   Pt has been working on diet and weight loss      Pt follows with Dr. Pat Babb (pul)  Last saw him in 9/16, follows annually   This physician manages his SCOTTY and titrates cpap    Compliant with cpap nightly      Continues effexor 150mg daily for depression, works well, happy with dose   He has recently been through transition at work and has been under increased stress with this    Pt's partner is HIV positive  They are interested in medication to prevent transmission  Discussed I do not prescribe this, provided referral to Dr. Peters Face        PREVENTIVE:  Colonoscopy: 11/21/2016, Dr. Fernando Alvarado, repeat 5 years  PSA: Dr. Heide Taylor (endo) follows  AAA screen: not needed, non smoker   Tdap: 8/29/2016  Pneumovax: 4/2015  Zostavax: not yet needed  Flu shot: 10/24/2016  Microalbumin: 12/16  Foot exam: 8/29/16  A1c: 5/16 6.2, 8/16 6.0, 12/16 6.0  Eye exam: Dr. Danielle Medina, 3/16  Lipids: 12/16 LDL 58          Patient Active Problem List    Diagnosis Date Noted    Reactive depression 12/12/2016    Essential hypertension with goal blood pressure less than 140/90 05/23/2016    Coronary artery disease involving native coronary artery of native heart without angina pectoris 05/23/2016    Mild episode of recurrent major depressive disorder ([facility] [address] 05/23/2016    SCOTTY on CPAP 05/23/2016    Chronic systolic congestive heart failure ([facility] [address] 05/23/2016    Low testosterone 05/23/2016    Gastroesophageal reflux disease without esophagitis 05/23/2016     Current Outpatient Prescriptions   Medication Sig Dispense Refill    carvedilol (COREG) 6.25 mg tablet Take 1 tablet by mouth two  times daily 180 Tab 1    pravastatin (PRAVACHOL) 20 mg tablet Take 1 Tab by mouth nightly.  90 Tab 1    venlafaxine-SR (EFFEXOR-XR) 150 mg stable on current medical therapy  Orders:  -     dilTIAZem CD (CARDIZEM CD) 180 MG 24 hr capsule; Take 1 capsule by mouth Daily.  Dispense: 30 capsule; Refill: 3  -     labetalol (NORMODYNE) 200 MG tablet; Take 1 tablet by mouth 2 (Two) Times a Day.  Dispense: 60 tablet; Refill: 3  -     Comprehensive Metabolic Panel; Future    3. Hypertension in pregnancy, preeclampsia, delivered            MEDICAL DECISION MAKING:    Patient's blood pressure is stable.  Heart rate is tachycardic.  She has lost a significant amount of weight since her hospital discharge on May 13.  Her weight is currently 92 pounds.    Would like to check a CBC and a CMP.  I have asked her to consider taking a multivitamin with iron.    She has upcoming follow-up with OB.    I have made no changes in her medication we will continue labetalol and Cardizem.  She reports she is taking her medications as prescribed.  I have provided her refills.  I have asked her to check her blood pressure periodically at home.    We will plan on seeing her back for scheduled follow-up in 6 weeks.    Will review labs to rule out anemia.  She has been advised to stay well-hydrated, avoid caffeine and avoid additional weight loss.  She has been counseled regarding tobacco cessation recommendations.        Past Medical History:   Diagnosis Date   • Clavicle fracture    • Hypertension        History reviewed. No pertinent surgical history.      Current Outpatient Medications:   •  dilTIAZem CD (CARDIZEM CD) 180 MG 24 hr capsule, Take 1 capsule by mouth Daily., Disp: 30 capsule, Rfl: 3  •  Hydrocort-Pramoxine, Perianal, (ANALPRAM-HC) 2.5-1 % rectal cream, Insert 1 application into the rectum As Needed for Hemorrhoids., Disp: 30 g, Rfl: 0  •  labetalol (NORMODYNE) 200 MG tablet, Take 1 tablet by mouth 2 (Two) Times a Day., Disp: 60 tablet, Rfl: 3  •  witch hazel-glycerin (TUCKS) pad, Apply 1 pad topically to the appropriate area as directed As Needed for Irritation or  "Hemorrhoids., Disp: 30 each, Rfl: 0    Social History     Socioeconomic History   • Marital status: Single   Tobacco Use   • Smoking status: Former Smoker     Quit date: 10/16/2021     Years since quittin.6   • Smokeless tobacco: Never Used   Vaping Use   • Vaping Use: Never used   Substance and Sexual Activity   • Alcohol use: Not Currently   • Drug use: Not Currently     Types: Methamphetamines   • Sexual activity: Defer       History reviewed. No pertinent family history.    The following portions of the patient's history were reviewed and updated as appropriate: allergies, current medications, past family history, past medical history, past social history, past surgical history and problem list.    Review of Systems   Constitutional: Negative for decreased appetite and diaphoresis.   HENT: Negative for congestion, hearing loss and nosebleeds.    Cardiovascular: Negative for chest pain, claudication, dyspnea on exertion, irregular heartbeat, leg swelling, near-syncope, orthopnea, palpitations, paroxysmal nocturnal dyspnea and syncope.   Respiratory: Negative for cough, shortness of breath and sleep disturbances due to breathing.    Endocrine: Negative for polyuria.   Hematologic/Lymphatic: Does not bruise/bleed easily.   Skin: Negative for itching and rash.   Musculoskeletal: Negative for back pain, muscle weakness and myalgias.   Gastrointestinal: Negative for abdominal pain, change in bowel habit and nausea.   Genitourinary: Negative for dysuria, flank pain, frequency and hesitancy.   Neurological: Negative for dizziness, tremors and weakness.   Psychiatric/Behavioral: Negative for altered mental status. The patient does not have insomnia.      /85   Pulse (!) 125   Ht 157.5 cm (62.01\")   Wt 41.7 kg (92 lb)   LMP 2021   SpO2 98%   BMI 16.82 kg/m² .  Objective     Vitals reviewed.   Constitutional:       General: Not in acute distress.     Appearance: Normal appearance. Well-developed. " capsule Take 1 Cap by mouth daily. 90 Cap 1    omeprazole (PRILOSEC) 20 mg capsule Take 1 Cap by mouth daily. 90 Cap 1    losartan (COZAAR) 50 mg tablet Take 1 Tab by mouth daily. 90 Tab 1    metFORMIN (GLUCOPHAGE) 500 mg tablet Take 1 Tab by mouth two (2) times daily (with meals). 180 Tab 1    ONETOUCH VERIO strip       ONE TOUCH DELICA 33 gauge misc       testosterone cypionate (DEPOTESTOTERONE CYPIONATE) 200 mg/mL injection by IntraMUSCular route every seven (7) days.  aspirin 81 mg chewable tablet Take 81 mg by mouth daily.  ciprofloxacin-dexamethasone (CIPRODEX) 0.3-0.1 % otic suspension Administer 4 Drops in left ear two (2) times a day. 7.5 mL 0     Past Surgical History:   Procedure Laterality Date    HX COLONOSCOPY  11/2016    HX GI      colonoscopy    HX OTHER SURGICAL      anal fissure      Lab Results  Component Value Date/Time   WBC 13.1 02/06/2017 11:41 AM   Hemoglobin (POC) 15.6 02/06/2017 11:40 AM   HGB 15.0 02/06/2017 11:41 AM   Hematocrit (POC) 46 02/06/2017 11:40 AM   HCT 44.9 02/06/2017 11:41 AM   PLATELET 906 60/09/0939 11:41 AM   MCV 85.5 02/06/2017 11:41 AM       Lab Results  Component Value Date/Time   Cholesterol, total 168 12/12/2016 09:03 AM   HDL Cholesterol 31 12/12/2016 09:03 AM   LDL, calculated 58 12/12/2016 09:03 AM   Triglyceride 395 12/12/2016 09:03 AM       Lab Results  Component Value Date/Time   GFR est  12/12/2016 09:03 AM   GFR est non- 12/12/2016 09:03 AM   Creatinine (POC) 0.8 02/06/2017 11:40 AM   Creatinine 0.82 12/12/2016 09:03 AM   BUN 13 12/12/2016 09:03 AM   BUN (POC) 13 02/06/2017 11:40 AM   Sodium (POC) 140 02/06/2017 11:40 AM   Sodium 139 12/12/2016 09:03 AM   Potassium 4.4 12/12/2016 09:03 AM   Potassium (POC) 3.3 02/06/2017 11:40 AM   Chloride (POC) 100 02/06/2017 11:40 AM   Chloride 99 12/12/2016 09:03 AM   CO2 25 12/12/2016 09:03 AM         Review of Systems   Respiratory: Negative for shortness of breath.     Cardiovascular:   Eyes:      Pupils: Pupils are equal, round, and reactive to light.   HENT:      Head: Normocephalic and atraumatic.   Neck:      Vascular: No JVD.   Pulmonary:      Effort: Pulmonary effort is normal.      Breath sounds: Normal breath sounds.   Cardiovascular:      Tachycardia present. Regular rhythm.   Pulses:     Intact distal pulses.   Edema:     Peripheral edema absent.   Abdominal:      General: There is no distension.      Palpations: Abdomen is soft.      Tenderness: There is no abdominal tenderness.   Musculoskeletal: Normal range of motion.      Cervical back: Normal range of motion and neck supple. Skin:     General: Skin is warm and dry.   Neurological:      Mental Status: Alert and oriented to person, place, and time.             ECG 12 Lead    Date/Time: 6/7/2022 3:33 PM  Performed by: Cristy Jackson APRN  Authorized by: Cristy Jackson APRN   Comparison: not compared with previous ECG   Rhythm: sinus tachycardia  BPM: 125  QRS axis: indeterminate    Clinical impression: abnormal EKG            EKG ordered by and reviewed by me in office                   Negative for chest pain. Physical Exam   Constitutional: He is oriented to person, place, and time. He appears well-developed and well-nourished. No distress. HENT:   Head: Normocephalic and atraumatic. Mouth/Throat: Oropharynx is clear and moist. No oropharyngeal exudate. Eyes: Conjunctivae and EOM are normal. Right eye exhibits no discharge. Left eye exhibits no discharge. Neck: Normal range of motion. Neck supple. No carotid bruits     Cardiovascular: Normal rate, regular rhythm, normal heart sounds and intact distal pulses. Exam reveals no gallop and no friction rub. No murmur heard. Pulmonary/Chest: Effort normal and breath sounds normal. No respiratory distress. He has no wheezes. He has no rales. He exhibits no tenderness. Abdominal: Soft. He exhibits no distension and no mass. There is no tenderness. There is no rebound and no guarding. Musculoskeletal: Normal range of motion. He exhibits no edema, tenderness or deformity. Lymphadenopathy:     He has no cervical adenopathy. Neurological: He is alert and oriented to person, place, and time. He has normal reflexes. Coordination normal.   Skin: Skin is warm and dry. No rash noted. He is not diaphoretic. No erythema. No pallor. Psychiatric: He has a normal mood and affect. His behavior is normal.       ASSESSMENT and PLAN    ICD-10-CM ICD-9-CM    1. Essential hypertension with goal blood pressure less than 140/90    BP well controlled on losartan 50mg and coreg 6.25mg BID I10 401.9 CK      HEMOGLOBIN A1C WITH EAG      CBC W/O DIFF      METABOLIC PANEL, COMPREHENSIVE      TSH 3RD GENERATION      DUPLEX CAROTID BILATERAL   2.  Coronary artery disease involving native coronary artery of native heart without angina pectoris    Follows with Dr. Geeta Manning annually in June, however, since last visit had syncopal episode and advised him to schedule earlier f/u  I25.10 414.01 CK      HEMOGLOBIN A1C WITH EAG      CBC W/O DIFF      METABOLIC PANEL, COMPREHENSIVE      TSH 3RD GENERATION      DUPLEX CAROTID BILATERAL   3. Vasovagal syncope    Will check carotid duplex, unclear if related to anxiety versus increased testosterone, pt also reports h/o palpitations which may be contributing, needs to see Dr. Toma Farah to get ECHO and event monitor completed, I wrote this down and discussed this with patient, head CT already completed and negative, carotid duplex ordered. R55 780.2 CK      HEMOGLOBIN A1C WITH EAG      CBC W/O DIFF      METABOLIC PANEL, COMPREHENSIVE      TSH 3RD GENERATION      DUPLEX CAROTID BILATERAL   4. Tricia Amado and gets testosterone injections with him E29.1 257.2 CK      HEMOGLOBIN A1C WITH EAG      CBC W/O DIFF      METABOLIC PANEL, COMPREHENSIVE      TSH 3RD GENERATION      DUPLEX CAROTID BILATERAL   5. SCOTTY on CPAP    Compliant with cpap G47.33 327.23 CK      HEMOGLOBIN A1C WITH EAG      CBC W/O DIFF      METABOLIC PANEL, COMPREHENSIVE      TSH 3RD GENERATION      DUPLEX CAROTID BILATERAL   6. Gastroesophageal reflux disease without esophagitis    Controlled on prilosec 20mg daily  K21.9 530.81 CK      HEMOGLOBIN A1C WITH EAG      CBC W/O DIFF      METABOLIC PANEL, COMPREHENSIVE      TSH 3RD GENERATION      DUPLEX CAROTID BILATERAL   7. Diabetes mellitus without complication (HCC)    On metformin BID, home readings good, repeat a1c today E11.9 250.00 CK      HEMOGLOBIN A1C WITH EAG      CBC W/O DIFF      METABOLIC PANEL, COMPREHENSIVE      TSH 3RD GENERATION      DUPLEX CAROTID BILATERAL   8. Elevated CK    Repeat CK to ensure back to normal R74.8 790.5 CK      HEMOGLOBIN A1C WITH EAG      CBC W/O DIFF      METABOLIC PANEL, COMPREHENSIVE      TSH 3RD GENERATION      DUPLEX CAROTID BILATERAL   9.  HIV exposure    Will discuss with Dr. Putnam Parents eligibility for prophylactic HIV meds Z20.6 V01.79 REFERRAL TO INFECTIOUS DISEASE          Written by Rose Marie Christian, as dictated by Vinod Poole MD.    Current diagnosis and concerns discussed with pt at length. Understands risks and benefits or current treatment plan and medications and accepts the treatment and medication with any possible risks.   Pt asks appropriate questions which were answered.   Pt instructed to call with any concerns or problems. This note will not be viewable in [address]

## 2022-06-08 RX ORDER — DILTIAZEM HYDROCHLORIDE 180 MG/1
180 CAPSULE, COATED, EXTENDED RELEASE ORAL
Qty: 30 CAPSULE | Refills: 3 | Status: SHIPPED | OUTPATIENT
Start: 2022-06-08 | End: 2022-07-12 | Stop reason: SDUPTHER

## 2022-06-08 RX ORDER — LABETALOL 200 MG/1
200 TABLET, FILM COATED ORAL 2 TIMES DAILY
Qty: 60 TABLET | Refills: 3 | Status: SHIPPED | OUTPATIENT
Start: 2022-06-08 | End: 2022-07-12 | Stop reason: SDUPTHER

## 2022-07-12 DIAGNOSIS — R00.0 SINUS TACHYCARDIA: ICD-10-CM

## 2022-07-12 DIAGNOSIS — I10 ESSENTIAL HYPERTENSION: ICD-10-CM

## 2022-07-12 RX ORDER — LABETALOL 200 MG/1
200 TABLET, FILM COATED ORAL 2 TIMES DAILY
Qty: 60 TABLET | Refills: 3 | Status: SHIPPED | OUTPATIENT
Start: 2022-07-12

## 2022-07-12 RX ORDER — DILTIAZEM HYDROCHLORIDE 180 MG/1
180 CAPSULE, COATED, EXTENDED RELEASE ORAL
Qty: 30 CAPSULE | Refills: 3 | Status: SHIPPED | OUTPATIENT
Start: 2022-07-12

## 2022-08-10 PROBLEM — R00.0 TACHYCARDIA: Status: ACTIVE | Noted: 2022-08-10

## 2023-07-18 PROCEDURE — 99282 EMERGENCY DEPT VISIT SF MDM: CPT

## 2023-07-19 ENCOUNTER — HOSPITAL ENCOUNTER (EMERGENCY)
Facility: HOSPITAL | Age: 23
Discharge: HOME | End: 2023-07-19
Attending: EMERGENCY MEDICINE | Admitting: EMERGENCY MEDICINE
Payer: MEDICAID

## 2023-07-19 VITALS
TEMPERATURE: 98.5 F | BODY MASS INDEX: 15.94 KG/M2 | HEIGHT: 62 IN | DIASTOLIC BLOOD PRESSURE: 87 MMHG | WEIGHT: 86.64 LBS | RESPIRATION RATE: 17 BRPM | OXYGEN SATURATION: 99 % | SYSTOLIC BLOOD PRESSURE: 125 MMHG | HEART RATE: 98 BPM

## 2023-07-19 DIAGNOSIS — S61.211A LACERATION OF LEFT INDEX FINGER WITHOUT FOREIGN BODY WITHOUT DAMAGE TO NAIL, INITIAL ENCOUNTER: Primary | ICD-10-CM

## 2023-07-19 RX ORDER — DIAPER,BRIEF,INFANT-TODD,DISP
1 EACH MISCELLANEOUS EVERY 12 HOURS SCHEDULED
Status: DISCONTINUED | OUTPATIENT
Start: 2023-07-19 | End: 2023-07-19 | Stop reason: HOSPADM

## 2023-07-19 RX ORDER — NAPROXEN 500 MG/1
500 TABLET ORAL 2 TIMES DAILY WITH MEALS
Qty: 20 TABLET | Refills: 0 | Status: SHIPPED | OUTPATIENT
Start: 2023-07-19

## 2023-07-19 RX ADMIN — BACITRACIN 0.9 G: 500 OINTMENT TOPICAL at 02:26

## 2023-07-19 NOTE — ED PROVIDER NOTES
Subjective   History of Present Illness  Patient is a 23-year-old female presents to the ED with complaints of laceration on her left index finger that happened about an hour prior to arrival.  Patient states she cut it on a metal paint scraper.  She states the bleeding was not well controlled at home which prompted her to come to the ED.  She denies any paresthesias numbness weakness of her left hand.  Patient is on no blood thinners.  She rates her pain a 8/10 in severity denies any prior injuries to the area.  Patient is up-to-date on tetanus vaccination.    History provided by:  Patient    Review of Systems   Constitutional:  Negative for fever.   Respiratory:  Negative for shortness of breath.    Cardiovascular:  Negative for chest pain.   Gastrointestinal:  Negative for nausea and vomiting.   Musculoskeletal:  Negative for arthralgias.   Skin:  Positive for wound.   Neurological:  Negative for weakness and numbness.     Past Medical History:   Diagnosis Date    Clavicle fracture     Hypertension        No Known Allergies    No past surgical history on file.    No family history on file.    Social History     Socioeconomic History    Marital status: Single   Tobacco Use    Smoking status: Former     Types: Cigarettes     Quit date: 10/16/2021     Years since quittin.7    Smokeless tobacco: Never   Vaping Use    Vaping Use: Never used   Substance and Sexual Activity    Alcohol use: Not Currently    Drug use: Not Currently     Types: Methamphetamines    Sexual activity: Defer           Objective   Physical Exam  Vitals and nursing note reviewed.   Constitutional:       General: She is not in acute distress.     Appearance: Normal appearance. She is well-developed. She is not ill-appearing, toxic-appearing or diaphoretic.   HENT:      Head: Normocephalic and atraumatic.      Mouth/Throat:      Mouth: Mucous membranes are moist.      Pharynx: Oropharynx is clear.   Eyes:      General: No scleral icterus.      Extraocular Movements: Extraocular movements intact.      Pupils: Pupils are equal, round, and reactive to light.   Cardiovascular:      Rate and Rhythm: Normal rate and regular rhythm.      Pulses: Normal pulses.      Heart sounds: No murmur heard.    No friction rub. No gallop.   Pulmonary:      Effort: Pulmonary effort is normal. No respiratory distress.      Breath sounds: Normal breath sounds. No stridor. No wheezing, rhonchi or rales.   Chest:      Chest wall: No tenderness.   Musculoskeletal:      Left wrist: Normal.      Left hand: Laceration and tenderness present. No swelling, deformity or bony tenderness. Normal range of motion.        Hands:       Comments: Symmetrically palpable radial and ulnar pulses. The flow with 2 seconds in all digits.  Intact sensation to light touch radial median ulnar nerve demonstrated a testing in the dorsal webspace of the thumb, the distal palmar aspect of the index finger, in lateral surface of the 5th finger.  Laceration of the left index finger as noted above.  Intact motor function of the radial median ulnar Strength with extension of a slight distal joint of the index finger, hand , and spreading of the 2nd through 5th digits.  Intact recurrent medial nerve as demonstrated by thumb fully  Opposition, abduction and flexion.  No snuffbox tenderness     Skin:     General: Skin is warm.      Capillary Refill: Capillary refill takes less than 2 seconds.      Coloration: Skin is not cyanotic, jaundiced or pale.      Findings: No rash.   Neurological:      General: No focal deficit present.      Mental Status: She is alert and oriented to person, place, and time.   Psychiatric:         Mood and Affect: Mood normal.         Behavior: Behavior normal.       Laceration Repair    Date/Time: 7/19/2023 5:38 AM  Performed by: Steffen Troncoso PA  Authorized by: Scott Sanchez MD     Consent:     Consent obtained:  Verbal and emergent situation    Consent given by:   "Patient    Risks discussed:  Infection, pain, tendon damage, vascular damage, poor wound healing and nerve damage    Alternatives discussed:  No treatment and delayed treatment  Universal protocol:     Procedure explained and questions answered to patient or proxy's satisfaction: yes      Immediately prior to procedure, a time out was called: yes      Patient identity confirmed:  Verbally with patient  Anesthesia:     Anesthesia method:  Local infiltration    Local anesthetic:  Lidocaine 1% w/o epi  Laceration details:     Location:  Finger    Finger location:  L index finger    Length (cm):  1  Pre-procedure details:     Preparation:  Patient was prepped and draped in usual sterile fashion  Exploration:     Wound exploration: wound explored through full range of motion and entire depth of wound visualized      Wound extent: no foreign bodies/material noted, no nerve damage noted and no tendon damage noted      Contaminated: no    Treatment:     Area cleansed with:  Chlorhexidine and saline    Amount of cleaning:  Standard  Skin repair:     Repair method:  Sutures    Suture size:  5-0    Suture material:  Nylon    Suture technique:  Simple interrupted    Number of sutures:  3  Post-procedure details:     Dressing:  Antibiotic ointment, splint for protection and adhesive bandage    Procedure completion:  Tolerated well, no immediate complications           ED Course      /87   Pulse 98   Temp 98.5 °F (36.9 °C)   Resp 17   Ht 157.5 cm (62\")   Wt 39.3 kg (86 lb 10.3 oz)   LMP 07/15/2023   SpO2 99%   BMI 15.85 kg/m²   Medications - No data to display  Labs Reviewed - No data to display  No orders to display                                          Medical Decision Making    Differentials: Laceration, abrasion, tendon injury, open fracture     ;this list is not all inclusive and does not constitute the entirety of considered causes      Disposition/Treatment:  Appropriate PPE was worn during exam and " throughout all encounters with the patient.  When the ED patient was afebrile and appeared nontoxic had superficial laceration noted along the left index finger which was repaired as above.  Per report patient's tetanus vaccination is up-to-date.  On physical exam there are no signs of tendon or nerve injury was able to move her finger well without significant difficulty.  No reports of numbness.  Findings were discussed with the patient along with wound care instructions and signs and symptoms to return she was in agreement with plan all questions were answered.    This document is intended for medical expert use only. Reading of this document by patients and/or patient's family without participating medical staff guidance may result in misinterpretation and unintended morbidity.  Any interpretation of such data is the responsibility of the patient and/or family member responsible for the patient in concert with their primary or specialist providers, not to be left for sources of online searches such as "[employer] or similar queries. Relying on these approaches to knowledge may result in misinterpretation, misguided goals of care and even death should patients or family members try recommendations outside of the realm of professional medical care in a supervised inpatient environment.       Problems Addressed:  Laceration of left index finger without foreign body without damage to nail, initial encounter: acute illness or injury    Risk  Prescription drug management.        Final diagnoses:   Laceration of left index finger without foreign body without damage to nail, initial encounter       ED Disposition  ED Disposition       ED Disposition   Discharge    Condition   Stable    Comment   --               [facility] EMERGENCY DEPARTMENT  [address] [facility] [address] [phone]  Go to   If symptoms worsen    [facility] [address]  [phone]              Medication List        New Prescriptions      naproxen 500 MG tablet  Commonly known as: NAPROSYN  Take 1 tablet by mouth 2 (Two) Times a Day With Meals.               Where to Get Your Medications        These medications were sent to [facility] - [place], [place] - [address] - [phone]  - [phone]   [address] [place] [address]      Phone: [phone]   naproxen 500 MG tablet            Steffen Troncoso PA  07/19/23 3419

## 2023-07-19 NOTE — DISCHARGE INSTRUCTIONS
Keep the wound clean with soap and water daily.  Apply antibiotic ointment daily.  SUTURES/ STAPLES to be removed in 7-10 days, scheduled appointment with your primary care provider to have this done, if you do not have a primary care provider any Urgent Care or immediate Care can remove SUTURES/ STAPLES.    Follow-up with your primary care provider in 3-5 days.  If you do not have a primary care provider call [phone] for help in finding one, or you may follow up with [facility] at [phone].    Take medication as directed

## 2024-08-12 DIAGNOSIS — I10 ESSENTIAL HYPERTENSION: ICD-10-CM

## 2024-08-12 DIAGNOSIS — R00.0 SINUS TACHYCARDIA: ICD-10-CM

## 2024-08-12 RX ORDER — LABETALOL 200 MG/1
200 TABLET, FILM COATED ORAL 2 TIMES DAILY
Qty: 60 TABLET | Refills: 2 | OUTPATIENT
Start: 2024-08-12

## 2024-08-12 RX ORDER — DILTIAZEM HYDROCHLORIDE 180 MG/1
180 CAPSULE, COATED, EXTENDED RELEASE ORAL
Qty: 30 CAPSULE | Refills: 2 | OUTPATIENT
Start: 2024-08-12

## 2025-04-20 ENCOUNTER — APPOINTMENT (OUTPATIENT)
Dept: GENERAL RADIOLOGY | Facility: HOSPITAL | Age: 25
End: 2025-04-20
Payer: MEDICAID

## 2025-04-20 ENCOUNTER — HOSPITAL ENCOUNTER (EMERGENCY)
Facility: HOSPITAL | Age: 25
Discharge: HOME | End: 2025-04-20
Admitting: EMERGENCY MEDICINE
Payer: MEDICAID

## 2025-04-20 VITALS
RESPIRATION RATE: 16 BRPM | HEIGHT: 62 IN | WEIGHT: 97.66 LBS | SYSTOLIC BLOOD PRESSURE: 113 MMHG | OXYGEN SATURATION: 98 % | TEMPERATURE: 98.5 F | BODY MASS INDEX: 17.97 KG/M2 | HEART RATE: 114 BPM | DIASTOLIC BLOOD PRESSURE: 82 MMHG

## 2025-04-20 DIAGNOSIS — M89.8X1 CLAVICLE PAIN: Primary | ICD-10-CM

## 2025-04-20 DIAGNOSIS — S42.024A CLOSED NONDISPLACED FRACTURE OF SHAFT OF RIGHT CLAVICLE, INITIAL ENCOUNTER: ICD-10-CM

## 2025-04-20 PROCEDURE — 99283 EMERGENCY DEPT VISIT LOW MDM: CPT

## 2025-04-20 PROCEDURE — 73000 X-RAY EXAM OF COLLAR BONE: CPT

## 2025-04-20 PROCEDURE — 73030 X-RAY EXAM OF SHOULDER: CPT

## 2025-04-20 RX ORDER — NAPROXEN 500 MG/1
500 TABLET ORAL 2 TIMES DAILY PRN
Qty: 10 TABLET | Refills: 0 | Status: SHIPPED | OUTPATIENT
Start: 2025-04-20

## 2025-04-20 NOTE — ED PROVIDER NOTES
Subjective   History of Present Illness  Patient is a 25-year-old female presenting to the ED for right clavicular pain after being tripped by her dog earlier today.  Patient states she was trying to get her dog coming to the house when he close landed her causing her to fall down catching herself on her right elbow causing right collarbone pain.  Patient states she has a history of a collarbone fracture in the past, was supposed to get surgery on it but then ended up pregnant.  Patient rates her pain currently a 6 out of 10, took 3 ibuprofen prior to arrival.  She reports slight numbness and tingling in her right shoulder that intermittently radiates down her right arm resolves quickly.  She denies hitting her head or loss of consciousness during the fall.        Review of Systems   Musculoskeletal:  Positive for arthralgias and joint swelling.       Past Medical History:   Diagnosis Date    Clavicle fracture     Hypertension        No Known Allergies    No past surgical history on file.    No family history on file.    Social History     Socioeconomic History    Marital status: Single   Tobacco Use    Smoking status: Former     Current packs/day: 0.00     Types: Cigarettes     Quit date: 10/16/2021     Years since quitting: 3.5    Smokeless tobacco: Never   Vaping Use    Vaping status: Never Used   Substance and Sexual Activity    Alcohol use: Not Currently    Drug use: Not Currently     Types: Methamphetamines    Sexual activity: Defer           Objective   Physical Exam  Constitutional:       Appearance: Normal appearance.   HENT:      Head: Normocephalic and atraumatic.      Mouth/Throat:      Mouth: Mucous membranes are moist.   Eyes:      Extraocular Movements: Extraocular movements intact.   Cardiovascular:      Rate and Rhythm: Normal rate and regular rhythm.      Pulses: Normal pulses.      Heart sounds: Normal heart sounds.   Pulmonary:      Effort: Pulmonary effort is normal.      Breath sounds: Normal  "breath sounds.   Abdominal:      General: Abdomen is flat.      Palpations: Abdomen is soft.      Tenderness: There is no abdominal tenderness.   Musculoskeletal:        Arms:       Cervical back: Normal range of motion.      Comments: Tenderness to palpation in center of right clavicle with visualized deformity, tenting of skin.  No surrounding erythema or ecchymosis.  Slight tenderness to palpation of right shoulder with slight decreased range of motion due to pain.  Small abrasion noted on right elbow, no tenderness to palpation or decreased range of motion.  No decreased range of motion or pain in right hand or wrist.   strength equal.  Capillary refill normal.  Pulses palpated bilaterally.   Skin:     General: Skin is warm and dry.      Capillary Refill: Capillary refill takes less than 2 seconds.   Neurological:      General: No focal deficit present.      Mental Status: She is alert and oriented to person, place, and time.   Psychiatric:         Mood and Affect: Mood normal.         Behavior: Behavior normal.         Procedures           ED Course      /82   Pulse 114   Temp 98.5 °F (36.9 °C) (Oral)   Resp 16   Ht 157.5 cm (62\")   Wt 44.3 kg (97 lb 10.6 oz)   LMP 04/17/2025   SpO2 98%   BMI 17.86 kg/m²   Labs Reviewed - No data to display  Medications - No data to display  XR Clavicle Right  Result Date: 4/20/2025  Impression: No acute fracture or traumatic malalignment. Chronic healed deformity of the mid right clavicle. Electronically Signed: Olaf Ortega MD  4/20/2025 5:55 PM EDT  Workstation ID: [id number]    XR Shoulder 2+ View Right  Result Date: 4/20/2025  Impression: No acute fracture or traumatic malalignment. Chronic healed deformity of the mid right clavicle. Electronically Signed: Olaf Ortega MD  4/20/2025 5:55 PM EDT  Workstation ID: [id number]                                                     Medical Decision Making  Patient presented to the ED for the above " complaint.    Patient underwent the above exam and evaluation.    While in the ED x-ray of right shoulder and clavicle was obtained to assess for fracture or dislocation.  Patient was offered pain medication, declined.  Upon reevaluation patient is resting comfortably, vital stable.  Discussed findings with patient.  Patient was placed in a sling, neurovascularly intact.  She will be discharged here with naproxen.  Educated patient to take naproxen as prescribed as needed, wear sling as directed, perform active range of motion of right shoulder daily as tolerated to prevent frozen shoulder, follow-up closely with Ortho doctor, follow-up with PCP, and strict return precautions were discussed.  Patient voiced understanding, agreeable dispo plan.  Patient ambulating independently without difficulty at time of discharge.    Labs were considered deemed unnecessary, patient afebrile, nontoxic in appearance, no acute distress, mechanical fall.  X-ray of right shoulder, x-ray right clavicle independently interpreted by Dr. Ryan and reviewed by myself showing: X-ray of right shoulder showed no acute fracture or malalignment.  X-ray right clavicle showed chronic healed deformity of mid right clavicle with suspicious calcifications, possible fracture.    Appropriate PPE was worn during each patient encounter.    Note Disclaimer: At [facility], we believe that sharing information builds trust and better relationships. You are receiving this note because you are receiving care at [facility] or recently visited. It is possible you will see health information before a provider has talked with you about it. This kind of information can be easy to misunderstand. To help you fully understand what it means for your health, we urge you to discuss this note with your provider.    Discussed this patient with Dr. Ryan who agrees with plan.      Problems Addressed:  Clavicle pain: complicated acute illness or injury  Closed  nondisplaced fracture of shaft of right clavicle, initial encounter: complicated acute illness or injury    Amount and/or Complexity of Data Reviewed  Radiology: ordered.    Risk  Prescription drug management.        Final diagnoses:   Clavicle pain   Closed nondisplaced fracture of shaft of right clavicle, initial encounter       ED Disposition  ED Disposition       ED Disposition   Discharge    Condition   Stable    Comment   --               PATIENT CONNECTION - [facility] [address]  [phone]  Schedule an appointment as soon as possible for a visit       [facility] [address] [address]  [phone]  Schedule an appointment as soon as possible for a visit            Medication List        New Prescriptions      naproxen 500 MG EC tablet  Commonly known as: EC NAPROSYN  Take 1 tablet by mouth 2 (Two) Times a Day As Needed for Mild Pain.  Replaces: naproxen 500 MG tablet            Stop      naproxen 500 MG tablet  Commonly known as: NAPROSYN  Replaced by: naproxen 500 MG EC tablet               Where to Get Your Medications        These medications were sent to [facility] - [place], [place] - [address] - [phone]  - [phone] [place] [address] [place] [address]      Phone: [phone]   naproxen 500 MG EC tablet            Harleen Flores PA-C  04/20/25 7855

## 2025-04-20 NOTE — DISCHARGE INSTRUCTIONS
Take as prescribed as needed.  Wear sling until further evaluation by Ortho doctor.  Perform daily active range of motion as tolerated of right shoulder to prevent frozen shoulder.  Can ice 20 minutes at a time multiple times a day with skin barrier for protection.    Follow-up with Ortho doctor.    Follow-up with PCP.    Return to the ED for any new or worsening symptoms.

## (undated) DEVICE — SPNG GZ AVANT 6PLY 4X4IN STRL PK/2

## (undated) DEVICE — UNDRGLV SURG BIOGEL PIMICROINDICATOR SYNTH SZ8 LF STRL

## (undated) DEVICE — SOLUTION,WATER,IRRIGATION,1000ML,STERILE: Brand: MEDLINE

## (undated) DEVICE — STAPLER, SKIN, 35W, A: Brand: MEDLINE INDUSTRIES, INC.

## (undated) DEVICE — DECANTER: Brand: UNBRANDED

## (undated) DEVICE — SOL IRRIG NACL 1000ML

## (undated) DEVICE — PK EXTREM 50

## (undated) DEVICE — NDL SUT CATGUT 1/2CIR TPR SZ5 2PK 1824-5D

## (undated) DEVICE — DRAPE,LAPAROTOMY,PCH,STERILE: Brand: MEDLINE

## (undated) DEVICE — OCCLUSIVE GAUZE STRIP OVERWRAP,3% BISMUTH TRIBROMOPHENATE IN PETROLATUM BLEND: Brand: XEROFORM

## (undated) DEVICE — GLV SURG SENSICARE PI ORTHO SZ8 LF STRL

## (undated) DEVICE — UNDERGLV SURG BIOGEL INDICAT PI SZ8 BLU

## (undated) DEVICE — GOWN,REINFORCE,POLY,SIRUS,BREATH SLV,XLG: Brand: MEDLINE

## (undated) DEVICE — SLV SCD CALF HEMOFORCE DVT THERP REPROC MD

## (undated) DEVICE — SUT VIC 0 CP1 27IN J267H

## (undated) DEVICE — 3M™ STERI-DRAPE™ U-DRAPE 1015: Brand: STERI-DRAPE™

## (undated) DEVICE — KT SURG TURNOVER 050

## (undated) DEVICE — C-ARM: Brand: UNBRANDED

## (undated) DEVICE — 3M™ STERI-DRAPE™ INSTRUMENT POUCH 1018: Brand: STERI-DRAPE™